# Patient Record
Sex: FEMALE | Race: WHITE | NOT HISPANIC OR LATINO | URBAN - METROPOLITAN AREA
[De-identification: names, ages, dates, MRNs, and addresses within clinical notes are randomized per-mention and may not be internally consistent; named-entity substitution may affect disease eponyms.]

---

## 2024-06-23 ENCOUNTER — EMERGENCY (EMERGENCY)
Facility: HOSPITAL | Age: 89
LOS: 0 days | Discharge: HOME | End: 2024-06-24
Attending: STUDENT IN AN ORGANIZED HEALTH CARE EDUCATION/TRAINING PROGRAM
Payer: MEDICARE

## 2024-06-23 VITALS
SYSTOLIC BLOOD PRESSURE: 123 MMHG | RESPIRATION RATE: 17 BRPM | TEMPERATURE: 98 F | DIASTOLIC BLOOD PRESSURE: 60 MMHG | WEIGHT: 109.79 LBS | HEART RATE: 90 BPM | OXYGEN SATURATION: 95 %

## 2024-06-23 DIAGNOSIS — M79.672 PAIN IN LEFT FOOT: ICD-10-CM
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
## 2024-06-23 DIAGNOSIS — G20.A1 PARKINSON'S DISEASE WITHOUT DYSKINESIA, WITHOUT MENTION OF FLUCTUATIONS: ICD-10-CM

## 2024-06-23 PROCEDURE — 73600 X-RAY EXAM OF ANKLE: CPT | Mod: 26,LT

## 2024-06-23 PROCEDURE — 73630 X-RAY EXAM OF FOOT: CPT | Mod: LT

## 2024-06-23 PROCEDURE — 73590 X-RAY EXAM OF LOWER LEG: CPT | Mod: 26,LT

## 2024-06-23 PROCEDURE — 99285 EMERGENCY DEPT VISIT HI MDM: CPT

## 2024-06-23 PROCEDURE — 73630 X-RAY EXAM OF FOOT: CPT | Mod: 26,LT

## 2024-06-23 PROCEDURE — 73110 X-RAY EXAM OF WRIST: CPT | Mod: LT

## 2024-06-23 PROCEDURE — 73110 X-RAY EXAM OF WRIST: CPT | Mod: 26,LT

## 2024-06-23 PROCEDURE — 73590 X-RAY EXAM OF LOWER LEG: CPT | Mod: LT

## 2024-06-23 PROCEDURE — 76376 3D RENDER W/INTRP POSTPROCES: CPT | Mod: 26

## 2024-06-23 PROCEDURE — 76376 3D RENDER W/INTRP POSTPROCES: CPT

## 2024-06-23 PROCEDURE — 73600 X-RAY EXAM OF ANKLE: CPT | Mod: LT

## 2024-06-23 PROCEDURE — 73700 CT LOWER EXTREMITY W/O DYE: CPT | Mod: MC,LT

## 2024-06-23 PROCEDURE — 99284 EMERGENCY DEPT VISIT MOD MDM: CPT | Mod: 25

## 2024-06-23 PROCEDURE — 73700 CT LOWER EXTREMITY W/O DYE: CPT | Mod: 26,LT,MC

## 2024-06-23 NOTE — ED PROVIDER NOTE - OBJECTIVE STATEMENT
89 year old female with PMHx of Parkinson's disease; presents to ED s/p unwitnessed fall in kitchen this AM, landed on stomach. Unknown head-strike. No LOC or AC use. Patient c/o left foot pain and swelling, worsened throughout the day and has pain with ambulation. Denies nausea, vomiting, HA 89 year old female with PMHx of Parkinson's disease; presents to ED s/p unwitnessed fall in kitchen this AM, landed on stomach. Unknown head-strike. No LOC or AC use. Patient c/o left foot pain and swelling, worsened throughout the day and has pain with ambulation. Denies nausea, vomiting, HA; no chest pain; no sob; no abdominal pain; no headache

## 2024-06-23 NOTE — ED PROVIDER NOTE - PATIENT PORTAL LINK FT
You can access the FollowMyHealth Patient Portal offered by [facility] by registering at the following website: [email or web address]. By joining [employer]’s FollowMyHealth portal, you will also be able to view your health information using other applications (apps) compatible with our system.

## 2024-06-23 NOTE — ED PROVIDER NOTE - WR ORDER STATUS 2
Performed
no orthopnea/no palpitations/no paroxysmal nocturnal dyspnea/no claudication/no dyspnea on exertion/no peripheral edema/no chest pain

## 2024-06-23 NOTE — ED PROVIDER NOTE - PROGRESS NOTE DETAILS
Jonathon Kovacs for attending Dr. Carr  Spoke with radiologist regarding foot XR, no evidence of fracture. As patient still exquisitely tender and unable to ambulate will obtain CT imaging. Family in agreement with plan Jabari Amaya MD CT rao reed dc. family comfortable w/ plan

## 2024-06-23 NOTE — ED PROVIDER NOTE - CARE PROVIDERS DIRECT ADDRESSES
[email or web address]

## 2024-06-23 NOTE — ED PROVIDER NOTE - WR ORDER DATE AND TIME 4
"Chief Complaint   Patient presents with     Wellness Visit       Initial /78  Pulse 72  Temp 98  F (36.7  C) (Oral)  Ht 5' 4\" (1.626 m)  Wt 197 lb (89.4 kg)  SpO2 96%  BMI 33.81 kg/m2 Estimated body mass index is 33.81 kg/(m^2) as calculated from the following:    Height as of this encounter: 5' 4\" (1.626 m).    Weight as of this encounter: 197 lb (89.4 kg).  Medication Reconciliation: complete   Sola Gamboa Certified Medical Assistant      " 23-Jun-2024 21:14

## 2024-06-23 NOTE — ED ADULT TRIAGE NOTE - CHIEF COMPLAINT QUOTE
Pt presents to ED via wheelchair s/p unwitnessed fall at 0900. PMH parkinsons. Pt states she was walking in kitchen and slipped landing on stomach, endorses worsening L foot pain of 9 out of 10 w ambulation. Pt denies LOC or AC, unknown headstrike. Pt also denies n/v HA, visual changes. NKDA, Pt A&O4 at triage. MD. Carr called for eval, no code called.

## 2024-06-23 NOTE — ED PROVIDER NOTE - CARE PROVIDER_API CALL
Delta Bui  Orthopaedic Surgery  [address] [place] [id number]  Phone: [phone]  Fax: [phone]  Follow Up Time:

## 2024-06-23 NOTE — ED PROVIDER NOTE - NSFOLLOWUPINSTRUCTIONS_ED_ALL_ED_FT
Foot Pain  Many things can cause foot pain. Common causes include injuries to the foot. The injuries include sprains or broken bones, or injuries that affect the nerves in the feet. Other causes of foot pain include arthritis, blisters, and bunions.    To know what causes your foot pain, your health care provider will take a detailed history of your symptoms. They will also do a physical exam as well as imaging tests, such as X-ray or MRI.    Follow these instructions at home:  Managing pain, stiffness, and swelling    Bag of ice on a towel on the skin.  If told, put ice on the painful area.  Put ice in a plastic bag.  Place a towel between your skin and the bag.  Leave the ice on for 20 minutes, 2–3 times a day.  If your skin turns bright red, remove the ice right away to prevent skin damage. The risk of damage is higher if you cannot feel pain, heat, or cold.  Activity    Do not stand or walk for long periods.  Do stretches to relieve foot pain and stiffness as told by your provider.  Do not lift anything that is heavier than 10 lb (4.5 kg), or the limit that you are told, until your provider says that it is safe. Lifting a lot of weight can put added pressure on your feet.  Return to your normal activities as told by your provider. Ask your provider what activities are safe for you.  Lifestyle    Wear comfortable, supportive shoes that fit you well. Do not wear high heels.  Keep your feet clean and dry.  General instructions    Take over-the-counter and prescription medicines only as told by your provider.  Rub your foot gently.  Pay attention to any changes in your symptoms. Let your provider know if symptoms become worse.  Keep all follow-up visits. Your provider will want to monitor your progress.  Contact a health care provider if:  Your pain does not get better after a few days of treatment at home.  Your pain gets worse.  You cannot stand on your foot.  Your foot or toes are swollen.  Your foot is numb or tingling.  Get help right away if:  Your foot or toes turn white or blue.  You have warmth and redness along your foot.  This information is not intended to replace advice given to you by your health care provider. Make sure you discuss any questions you have with your health care provider.    Document Revised: 01/11/2024 Document Reviewed: 09/19/2023  [employer] Patient Education © 2024 Elsevier Inc.  Elsevier logo  Terms and Conditions  Privacy Policy  Editorial Policy  All content on this site: Copyright © 2024 Elsevier, its licensors, and contributors. All rights are reserved, including those for text and data mining, AI training, and similar technologies. For all open access content, the Creative Commons licensing terms apply.  Cookies are used by this site. To decline or learn more, visit our Cookies page.  [employer]

## 2024-06-23 NOTE — ED PROVIDER NOTE - CLINICAL SUMMARY MEDICAL DECISION MAKING FREE TEXT BOX
89 year old female presents to ED c/o left foot pain and swelling, and old wrist pain. Plan XR, pain control and re-eval. 89 year old female presents to ED c/o left foot pain and swelling, and old wrist pain. Plan XR, pain control and re-eval.    Lilly DO: patient still with left foot pain; inability to ambulate; CT ordered for further eval; s/o to Dr. Amaya at 1045pm.

## 2024-06-24 VITALS
HEART RATE: 100 BPM | DIASTOLIC BLOOD PRESSURE: 60 MMHG | RESPIRATION RATE: 16 BRPM | OXYGEN SATURATION: 100 % | SYSTOLIC BLOOD PRESSURE: 135 MMHG | TEMPERATURE: 98 F

## 2024-06-24 NOTE — ED ADULT NURSE NOTE - NS ED NURSE RECORD ANOTHER HT AND WT
What Type Of Note Output Would You Prefer (Optional)?: Bullet Format How Severe Is Your Skin Lesion?: moderate Has Your Skin Lesion Been Treated?: not been treated Is This A New Presentation, Or A Follow-Up?: Skin Lesion Yes

## 2024-06-24 NOTE — ED ADULT NURSE NOTE - OBJECTIVE STATEMENT
Pt presents to ED c/o unwitnessed fall at 0900. PMHx Parkinson's. Pt states she was walking in kitchen and slipped landing on stomach, endorses worsening L foot pain, 9/10 w/ ambulation. Pt denies LOC, unknown headstrike, denies H/A, visual changes, dizziness. NKDA. Pt AxOx4, RR even and unlabored on RA.

## 2024-06-24 NOTE — ED ADULT NURSE NOTE - NSFALLHARMRISKINTERV_ED_ALL_ED

## 2024-09-27 ENCOUNTER — APPOINTMENT (OUTPATIENT)
Dept: SURGERY | Facility: CLINIC | Age: 89
End: 2024-09-27
Payer: MEDICARE

## 2024-09-27 VITALS
RESPIRATION RATE: 16 BRPM | BODY MASS INDEX: 20.43 KG/M2 | OXYGEN SATURATION: 94 % | HEART RATE: 82 BPM | HEIGHT: 62 IN | TEMPERATURE: 97.8 F | SYSTOLIC BLOOD PRESSURE: 117 MMHG | DIASTOLIC BLOOD PRESSURE: 66 MMHG | WEIGHT: 111 LBS

## 2024-09-27 DIAGNOSIS — S81.802A UNSPECIFIED OPEN WOUND, LEFT LOWER LEG, INITIAL ENCOUNTER: ICD-10-CM

## 2024-09-27 DIAGNOSIS — G20.A1 PARKINSON'S DISEASE WITHOUT DYSKINESIA, WITHOUT MENTION OF FLUCTUATIONS: ICD-10-CM

## 2024-09-27 PROBLEM — Z00.00 ENCOUNTER FOR PREVENTIVE HEALTH EXAMINATION: Status: ACTIVE | Noted: 2024-09-27

## 2024-09-27 PROCEDURE — 99202 OFFICE O/P NEW SF 15 MIN: CPT

## 2024-09-27 RX ORDER — CARBIDOPA AND LEVODOPA 25; 250 MG/1; MG/1
TABLET ORAL
Refills: 0 | Status: ACTIVE | COMMUNITY

## 2024-09-27 RX ORDER — SERTRALINE 25 MG/1
TABLET, FILM COATED ORAL
Refills: 0 | Status: ACTIVE | COMMUNITY

## 2024-09-27 NOTE — CONSULT LETTER
[Dear  ___] : Dear  [unfilled], [Consult Letter:] : I had the pleasure of evaluating your patient, [unfilled]. [Please see my note below.] : Please see my note below. [Consult Closing:] : Thank you very much for allowing me to participate in the care of this patient.  If you have any questions, please do not hesitate to contact me. [Sincerely,] : Sincerely, [FreeTextEntry3] : Wm Kailash HIDALGO

## 2024-09-27 NOTE — PHYSICAL EXAM
[JVD] : no jugular venous distention  [Normal Breath Sounds] : Normal breath sounds [Normal Heart Sounds] : normal heart sounds [Abdomen Tenderness] : ~T ~M No abdominal tenderness [No HSM] : no hepatosplenomegaly [Calm] : calm [de-identified] : NAD [de-identified] : NC/AT PER [de-identified] : soft [de-identified] : L shin wound 4 inches

## 2024-10-11 ENCOUNTER — INPATIENT (INPATIENT)
Facility: HOSPITAL | Age: 89
LOS: 3 days | Discharge: HOME | DRG: 536 | End: 2024-10-15
Attending: SURGERY | Admitting: SURGERY
Payer: MEDICARE

## 2024-10-11 VITALS
RESPIRATION RATE: 18 BRPM | TEMPERATURE: 97 F | DIASTOLIC BLOOD PRESSURE: 73 MMHG | HEART RATE: 77 BPM | OXYGEN SATURATION: 90 % | SYSTOLIC BLOOD PRESSURE: 137 MMHG

## 2024-10-11 DIAGNOSIS — Z28.21 IMMUNIZATION NOT CARRIED OUT BECAUSE OF PATIENT REFUSAL: ICD-10-CM

## 2024-10-11 DIAGNOSIS — Z98.1 ARTHRODESIS STATUS: ICD-10-CM

## 2024-10-11 PROCEDURE — 73552 X-RAY EXAM OF FEMUR 2/>: CPT | Mod: 26,LT

## 2024-10-11 PROCEDURE — 12001 RPR S/N/AX/GEN/TRNK 2.5CM/<: CPT

## 2024-10-11 PROCEDURE — 74176 CT ABD & PELVIS W/O CONTRAST: CPT | Mod: 26,MC

## 2024-10-11 PROCEDURE — 70450 CT HEAD/BRAIN W/O DYE: CPT | Mod: 26,MC

## 2024-10-11 PROCEDURE — 71250 CT THORAX DX C-: CPT | Mod: 26,MC

## 2024-10-11 PROCEDURE — 99285 EMERGENCY DEPT VISIT HI MDM: CPT | Mod: 25

## 2024-10-11 PROCEDURE — 72170 X-RAY EXAM OF PELVIS: CPT | Mod: 26

## 2024-10-11 PROCEDURE — 72125 CT NECK SPINE W/O DYE: CPT | Mod: 26,MC

## 2024-10-11 RX ORDER — MORPHINE SULFATE 30 MG/1
2 TABLET, FILM COATED, EXTENDED RELEASE ORAL ONCE
Refills: 0 | Status: DISCONTINUED | OUTPATIENT
Start: 2024-10-11 | End: 2024-10-11

## 2024-10-11 RX ORDER — ACETAMINOPHEN 325 MG
650 TABLET ORAL ONCE
Refills: 0 | Status: COMPLETED | OUTPATIENT
Start: 2024-10-11 | End: 2024-10-11

## 2024-10-11 RX ORDER — TETANUS TOXOID, REDUCED DIPHTHERIA TOXOID AND ACELLULAR PERTUSSIS VACCINE, ADSORBED 5; 2.5; 8; 8; 2.5 [IU]/.5ML; [IU]/.5ML; UG/.5ML; UG/.5ML; UG/.5ML
0.5 SUSPENSION INTRAMUSCULAR ONCE
Refills: 0 | Status: COMPLETED | OUTPATIENT
Start: 2024-10-11 | End: 2024-10-11

## 2024-10-11 RX ADMIN — TETANUS TOXOID, REDUCED DIPHTHERIA TOXOID AND ACELLULAR PERTUSSIS VACCINE, ADSORBED 0.5 MILLILITER(S): 5; 2.5; 8; 8; 2.5 SUSPENSION INTRAMUSCULAR at 22:12

## 2024-10-11 RX ADMIN — Medication 650 MILLIGRAM(S): at 22:12

## 2024-10-11 RX ADMIN — Medication 650 MILLIGRAM(S): at 23:40

## 2024-10-11 NOTE — ED ADULT TRIAGE NOTE - CHIEF COMPLAINT QUOTE
BIBEMS from home s/p mechanical fall down 2-3 steps. AAOx3. -LOC, +Head strike, -Anticoagulation. Lac to back of the head, bleeding controlled PTA. C/O left hip pain. MD Irvin called to bedside for eval, neuro alert called at 2115. GCS 15. Pt brought directly to CT.

## 2024-10-11 NOTE — ED PROVIDER NOTE - PROGRESS NOTE DETAILS
CT imaging  results negative  for any fx .   found to have pubic ramii fracture on my read. Ortho and trauma  consulted. pending eval.  discussed results with family.   lac repaired.  s/o to Dr. SIVAN Whitman..

## 2024-10-11 NOTE — ED ADULT NURSE NOTE - OBJECTIVE STATEMENT
Pt received on stretcher, A&Ox4, no labored breathing, pt sustained an unwitnessed mechanical fall backwards while walking up the stairs about 2-3 steps, no LOC, pt has her head wrapped with a curlex, pt denies any pain to the head however she is c/o left groin pain, pt is able to move all extremities, no deformities noted,
no

## 2024-10-11 NOTE — ED PROVIDER NOTE - PHYSICAL EXAMINATION
General: Patient in no acute distress, AAOX3.   HENMT: NC/AT, no nasal congestion, MMM  Neck: supple, no midline ttp.  CVS: regular rate and rhythm, no murmur  Resp: Good air entry bilaterally, No wheeze/rhonchi.  Abd: Soft non tender, non distended, +bowel sounds, No guarding, rebound tenderness   Ext: FROM in all ext, 2+ pulses throughout, cap refill<2 sec.  BACK: no midline tenderness, no stepoffs, no CVA ttp  NEURO: no focal deficit, gross motor and sensory intact throughout

## 2024-10-11 NOTE — ED PROVIDER NOTE - CLINICAL SUMMARY MEDICAL DECISION MAKING FREE TEXT BOX
89 yo f brought from home s/p mechanical fall down 2-3 steps. c/o left hip pain., will r/o ICH vs fx. Plan to do labs, imaging. meds and reassess.

## 2024-10-11 NOTE — ED ADULT NURSE REASSESSMENT NOTE - NS ED NURSE REASSESS COMMENT FT1
Pt has a 1 cm laceration to the right posterior head, no active bleeding, aprox 1cm long. Wound cleaned with NACI, pending repair by MD.

## 2024-10-11 NOTE — ED ADULT NURSE NOTE - NSFALLHARMRISKINTERV_ED_ALL_ED

## 2024-10-11 NOTE — ED PROVIDER NOTE - OBJECTIVE STATEMENT
89 yo f brought from home s/p mechanical fall down 2-3 steps. AAOx3. -LOC, +Head strike, -Anticoagulation. Lac to back of the head, bleeding controlled PTA. C/O left hip pain. 89 yo f brought from home s/p mechanical fall down 2-3 steps. c/o left hip pain., reported she hit her head, denies any LOC or any anticoagulation. Was unable to ambulate after due to L pelvic pain. Ambulates with walker at baseline. Denies Nausea, vomiting, numbness, tingling, fevers, chills, CP, SOB,

## 2024-10-12 DIAGNOSIS — S32.599A OTHER SPECIFIED FRACTURE OF UNSPECIFIED PUBIS, INITIAL ENCOUNTER FOR CLOSED FRACTURE: ICD-10-CM

## 2024-10-12 LAB
ABO RH CONFIRMATION: SIGNIFICANT CHANGE UP
ALBUMIN SERPL ELPH-MCNC: 2.5 G/DL — LOW (ref 3.3–5)
ALP SERPL-CCNC: 74 U/L — SIGNIFICANT CHANGE UP (ref 40–120)
ALT FLD-CCNC: 14 U/L — SIGNIFICANT CHANGE UP (ref 12–78)
ANION GAP SERPL CALC-SCNC: 4 MMOL/L — LOW (ref 5–17)
ANION GAP SERPL CALC-SCNC: 8 MMOL/L — SIGNIFICANT CHANGE UP (ref 5–17)
APTT BLD: 29.1 SEC — SIGNIFICANT CHANGE UP (ref 24.5–35.6)
AST SERPL-CCNC: 22 U/L — SIGNIFICANT CHANGE UP (ref 15–37)
BASOPHILS # BLD AUTO: 0.01 K/UL — SIGNIFICANT CHANGE UP (ref 0–0.2)
BASOPHILS NFR BLD AUTO: 0.1 % — SIGNIFICANT CHANGE UP (ref 0–2)
BILIRUB SERPL-MCNC: 0.8 MG/DL — SIGNIFICANT CHANGE UP (ref 0.2–1.2)
BLD GP AB SCN SERPL QL: SIGNIFICANT CHANGE UP
BUN SERPL-MCNC: 17 MG/DL — SIGNIFICANT CHANGE UP (ref 7–23)
BUN SERPL-MCNC: 20 MG/DL — SIGNIFICANT CHANGE UP (ref 7–23)
CALCIUM SERPL-MCNC: 8.8 MG/DL — SIGNIFICANT CHANGE UP (ref 8.5–10.1)
CALCIUM SERPL-MCNC: 9.1 MG/DL — SIGNIFICANT CHANGE UP (ref 8.5–10.1)
CHLORIDE SERPL-SCNC: 102 MMOL/L — SIGNIFICANT CHANGE UP (ref 96–108)
CHLORIDE SERPL-SCNC: 103 MMOL/L — SIGNIFICANT CHANGE UP (ref 96–108)
CO2 SERPL-SCNC: 25 MMOL/L — SIGNIFICANT CHANGE UP (ref 22–31)
CO2 SERPL-SCNC: 27 MMOL/L — SIGNIFICANT CHANGE UP (ref 22–31)
CREAT SERPL-MCNC: 0.5 MG/DL — SIGNIFICANT CHANGE UP (ref 0.5–1.3)
CREAT SERPL-MCNC: 0.57 MG/DL — SIGNIFICANT CHANGE UP (ref 0.5–1.3)
EGFR: 86 ML/MIN/1.73M2 — SIGNIFICANT CHANGE UP
EGFR: 88 ML/MIN/1.73M2 — SIGNIFICANT CHANGE UP
EOSINOPHIL # BLD AUTO: 0.03 K/UL — SIGNIFICANT CHANGE UP (ref 0–0.5)
EOSINOPHIL NFR BLD AUTO: 0.3 % — SIGNIFICANT CHANGE UP (ref 0–6)
GLUCOSE SERPL-MCNC: 117 MG/DL — HIGH (ref 70–99)
GLUCOSE SERPL-MCNC: 122 MG/DL — HIGH (ref 70–99)
HCT VFR BLD CALC: 36.2 % — SIGNIFICANT CHANGE UP (ref 34.5–45)
HCT VFR BLD CALC: 36.2 % — SIGNIFICANT CHANGE UP (ref 34.5–45)
HGB BLD-MCNC: 11.6 G/DL — SIGNIFICANT CHANGE UP (ref 11.5–15.5)
HGB BLD-MCNC: 11.8 G/DL — SIGNIFICANT CHANGE UP (ref 11.5–15.5)
IMM GRANULOCYTES NFR BLD AUTO: 0.8 % — SIGNIFICANT CHANGE UP (ref 0–0.9)
INR BLD: 1.11 RATIO — SIGNIFICANT CHANGE UP (ref 0.85–1.16)
LACTATE SERPL-SCNC: 1.2 MMOL/L — SIGNIFICANT CHANGE UP (ref 0.7–2)
LYMPHOCYTES # BLD AUTO: 0.88 K/UL — LOW (ref 1–3.3)
LYMPHOCYTES # BLD AUTO: 8.7 % — LOW (ref 13–44)
MAGNESIUM SERPL-MCNC: 1.8 MG/DL — SIGNIFICANT CHANGE UP (ref 1.6–2.6)
MCHC RBC-ENTMCNC: 27.8 PG — SIGNIFICANT CHANGE UP (ref 27–34)
MCHC RBC-ENTMCNC: 28.2 PG — SIGNIFICANT CHANGE UP (ref 27–34)
MCHC RBC-ENTMCNC: 32 GM/DL — SIGNIFICANT CHANGE UP (ref 32–36)
MCHC RBC-ENTMCNC: 32.6 GM/DL — SIGNIFICANT CHANGE UP (ref 32–36)
MCV RBC AUTO: 86.4 FL — SIGNIFICANT CHANGE UP (ref 80–100)
MCV RBC AUTO: 86.6 FL — SIGNIFICANT CHANGE UP (ref 80–100)
MONOCYTES # BLD AUTO: 0.44 K/UL — SIGNIFICANT CHANGE UP (ref 0–0.9)
MONOCYTES NFR BLD AUTO: 4.3 % — SIGNIFICANT CHANGE UP (ref 2–14)
NEUTROPHILS # BLD AUTO: 8.71 K/UL — HIGH (ref 1.8–7.4)
NEUTROPHILS NFR BLD AUTO: 85.8 % — HIGH (ref 43–77)
PHOSPHATE SERPL-MCNC: 3 MG/DL — SIGNIFICANT CHANGE UP (ref 2.5–4.5)
PLATELET # BLD AUTO: 182 K/UL — SIGNIFICANT CHANGE UP (ref 150–400)
PLATELET # BLD AUTO: 197 K/UL — SIGNIFICANT CHANGE UP (ref 150–400)
POTASSIUM SERPL-MCNC: 3.6 MMOL/L — SIGNIFICANT CHANGE UP (ref 3.5–5.3)
POTASSIUM SERPL-MCNC: 3.8 MMOL/L — SIGNIFICANT CHANGE UP (ref 3.5–5.3)
POTASSIUM SERPL-SCNC: 3.6 MMOL/L — SIGNIFICANT CHANGE UP (ref 3.5–5.3)
POTASSIUM SERPL-SCNC: 3.8 MMOL/L — SIGNIFICANT CHANGE UP (ref 3.5–5.3)
PROT SERPL-MCNC: 6.7 GM/DL — SIGNIFICANT CHANGE UP (ref 6–8.3)
PROTHROM AB SERPL-ACNC: 13.1 SEC — SIGNIFICANT CHANGE UP (ref 9.9–13.4)
RBC # BLD: 4.18 M/UL — SIGNIFICANT CHANGE UP (ref 3.8–5.2)
RBC # BLD: 4.19 M/UL — SIGNIFICANT CHANGE UP (ref 3.8–5.2)
RBC # FLD: 13.8 % — SIGNIFICANT CHANGE UP (ref 10.3–14.5)
RBC # FLD: 13.9 % — SIGNIFICANT CHANGE UP (ref 10.3–14.5)
SODIUM SERPL-SCNC: 133 MMOL/L — LOW (ref 135–145)
SODIUM SERPL-SCNC: 136 MMOL/L — SIGNIFICANT CHANGE UP (ref 135–145)
WBC # BLD: 10.15 K/UL — SIGNIFICANT CHANGE UP (ref 3.8–10.5)
WBC # BLD: 10.35 K/UL — SIGNIFICANT CHANGE UP (ref 3.8–10.5)
WBC # FLD AUTO: 10.15 K/UL — SIGNIFICANT CHANGE UP (ref 3.8–10.5)
WBC # FLD AUTO: 10.35 K/UL — SIGNIFICANT CHANGE UP (ref 3.8–10.5)

## 2024-10-12 PROCEDURE — 99223 1ST HOSP IP/OBS HIGH 75: CPT

## 2024-10-12 PROCEDURE — 84100 ASSAY OF PHOSPHORUS: CPT

## 2024-10-12 PROCEDURE — 80048 BASIC METABOLIC PNL TOTAL CA: CPT

## 2024-10-12 PROCEDURE — 82306 VITAMIN D 25 HYDROXY: CPT

## 2024-10-12 PROCEDURE — 99221 1ST HOSP IP/OBS SF/LOW 40: CPT

## 2024-10-12 PROCEDURE — 83735 ASSAY OF MAGNESIUM: CPT

## 2024-10-12 PROCEDURE — ZZZZZ: CPT

## 2024-10-12 PROCEDURE — 36415 COLL VENOUS BLD VENIPUNCTURE: CPT

## 2024-10-12 PROCEDURE — 85027 COMPLETE CBC AUTOMATED: CPT

## 2024-10-12 PROCEDURE — 85025 COMPLETE CBC W/AUTO DIFF WBC: CPT

## 2024-10-12 PROCEDURE — 97530 THERAPEUTIC ACTIVITIES: CPT | Mod: GP

## 2024-10-12 PROCEDURE — 83605 ASSAY OF LACTIC ACID: CPT

## 2024-10-12 PROCEDURE — 97116 GAIT TRAINING THERAPY: CPT | Mod: GP

## 2024-10-12 RX ORDER — SERTRALINE HYDROCHLORIDE 100 MG/1
1.5 TABLET, FILM COATED ORAL
Refills: 0 | DISCHARGE

## 2024-10-12 RX ORDER — CARBIDOPA/LEVODOPA 25MG-100MG
1.5 TABLET ORAL
Refills: 0 | DISCHARGE

## 2024-10-12 RX ORDER — ENOXAPARIN SODIUM 150 MG/ML
40 INJECTION SUBCUTANEOUS EVERY 24 HOURS
Refills: 0 | Status: DISCONTINUED | OUTPATIENT
Start: 2024-10-12 | End: 2024-10-15

## 2024-10-12 RX ORDER — OXYCODONE HYDROCHLORIDE 30 MG/1
5 TABLET, FILM COATED, EXTENDED RELEASE ORAL ONCE
Refills: 0 | Status: DISCONTINUED | OUTPATIENT
Start: 2024-10-12 | End: 2024-10-15

## 2024-10-12 RX ORDER — CARBIDOPA/LEVODOPA 25MG-100MG
1 TABLET ORAL THREE TIMES A DAY
Refills: 0 | Status: DISCONTINUED | OUTPATIENT
Start: 2024-10-12 | End: 2024-10-15

## 2024-10-12 RX ORDER — ONDANSETRON HCL/PF 4 MG/2 ML
4 VIAL (ML) INJECTION EVERY 6 HOURS
Refills: 0 | Status: DISCONTINUED | OUTPATIENT
Start: 2024-10-12 | End: 2024-10-15

## 2024-10-12 RX ORDER — OXYCODONE HYDROCHLORIDE 30 MG/1
2.5 TABLET, FILM COATED, EXTENDED RELEASE ORAL EVERY 6 HOURS
Refills: 0 | Status: DISCONTINUED | OUTPATIENT
Start: 2024-10-12 | End: 2024-10-15

## 2024-10-12 RX ORDER — SERTRALINE HYDROCHLORIDE 100 MG/1
100 TABLET, FILM COATED ORAL DAILY
Refills: 0 | Status: DISCONTINUED | OUTPATIENT
Start: 2024-10-12 | End: 2024-10-15

## 2024-10-12 RX ORDER — CYCLOBENZAPRINE HCL 10 MG
5 TABLET ORAL THREE TIMES A DAY
Refills: 0 | Status: DISCONTINUED | OUTPATIENT
Start: 2024-10-12 | End: 2024-10-15

## 2024-10-12 RX ORDER — LIDOCAINE 50 MG/G
1 CREAM TOPICAL EVERY 24 HOURS
Refills: 0 | Status: DISCONTINUED | OUTPATIENT
Start: 2024-10-12 | End: 2024-10-15

## 2024-10-12 RX ORDER — ACETAMINOPHEN 325 MG
650 TABLET ORAL EVERY 6 HOURS
Refills: 0 | Status: DISCONTINUED | OUTPATIENT
Start: 2024-10-12 | End: 2024-10-15

## 2024-10-12 RX ORDER — GABAPENTIN 800 MG/1
100 TABLET, FILM COATED ORAL THREE TIMES A DAY
Refills: 0 | Status: DISCONTINUED | OUTPATIENT
Start: 2024-10-12 | End: 2024-10-15

## 2024-10-12 RX ORDER — SODIUM CHLORIDE 0.9 % (FLUSH) 0.9 %
1000 SYRINGE (ML) INJECTION
Refills: 0 | Status: DISCONTINUED | OUTPATIENT
Start: 2024-10-12 | End: 2024-10-13

## 2024-10-12 RX ADMIN — ENOXAPARIN SODIUM 40 MILLIGRAM(S): 150 INJECTION SUBCUTANEOUS at 10:32

## 2024-10-12 RX ADMIN — GABAPENTIN 100 MILLIGRAM(S): 800 TABLET, FILM COATED ORAL at 22:15

## 2024-10-12 RX ADMIN — LIDOCAINE 1 PATCH: 50 CREAM TOPICAL at 04:41

## 2024-10-12 RX ADMIN — LIDOCAINE 1 PATCH: 50 CREAM TOPICAL at 14:08

## 2024-10-12 RX ADMIN — Medication 40 MILLIEQUIVALENT(S): at 10:32

## 2024-10-12 RX ADMIN — Medication 50 MILLILITER(S): at 04:43

## 2024-10-12 RX ADMIN — OXYCODONE HYDROCHLORIDE 2.5 MILLIGRAM(S): 30 TABLET, FILM COATED, EXTENDED RELEASE ORAL at 05:13

## 2024-10-12 RX ADMIN — SERTRALINE HYDROCHLORIDE 100 MILLIGRAM(S): 100 TABLET, FILM COATED ORAL at 15:05

## 2024-10-12 RX ADMIN — Medication 1 TABLET(S): at 22:15

## 2024-10-12 RX ADMIN — GABAPENTIN 100 MILLIGRAM(S): 800 TABLET, FILM COATED ORAL at 06:53

## 2024-10-12 RX ADMIN — Medication 1 TABLET(S): at 14:40

## 2024-10-12 RX ADMIN — OXYCODONE HYDROCHLORIDE 2.5 MILLIGRAM(S): 30 TABLET, FILM COATED, EXTENDED RELEASE ORAL at 04:43

## 2024-10-12 NOTE — CONSULT NOTE ADULT - SUBJECTIVE AND OBJECTIVE BOX
PCP: Zhen Spears    CHIEF COMPLAINT: mechanical fall    HISTORY OF THE PRESENT ILLNESS: 89 yo F w PMHx of Parkinsons, Lumbar fusion, and appendectomy coming in to ED after a mechanical fall walking up the stairs. The patient states she fell backwards when she lost balance and hit her head. Denies LOC. Daughter at bedside corroborated the story. Was previously on AC, but has since discontinued it for years. Denies chest pain, SOB, fevers or chills. No external signs of trauma. Pan scan negative, after calling radiology and reviewal of CT scan, bilateral pubic rami frx are noted.     PAST MEDICAL HISTORY:  Parkinsons    PAST SURGICAL HISTORY: Lumbar fusion, and appendectomy    FAMILY HISTORY:   non-contributory to the patient's current presentation    SOCIAL HISTORY:  no smoking, no alcohol, no drugs    REVIEW OF SYSTEMS:   All 10 systems reviewed in detailed and found to be negative with the exception of what has already been described above    MEDICATIONS  (STANDING):  enoxaparin Injectable 40 milliGRAM(s) SubCutaneous every 24 hours  gabapentin 100 milliGRAM(s) Oral three times a day  lidocaine   4% Patch 1 Patch Transdermal every 24 hours  sodium chloride 0.9%. 1000 milliLiter(s) (50 mL/Hr) IV Continuous <Continuous>    MEDICATIONS  (PRN):  acetaminophen     Tablet .. 650 milliGRAM(s) Oral every 6 hours PRN Temp greater or equal to 38C (100.4F), Mild Pain (1 - 3)  cyclobenzaprine 5 milliGRAM(s) Oral three times a day PRN Muscle Spasm  ondansetron Injectable 4 milliGRAM(s) IV Push every 6 hours PRN Nausea  oxyCODONE    IR 5 milliGRAM(s) Oral Once PRN Severe Pain (7 - 10)  oxyCODONE    IR 2.5 milliGRAM(s) Oral every 6 hours PRN Moderate Pain (4 - 6)      HEENT:  pupils equal and reactive, EOMI, no oropharyngeal lesions, erythema, exudates, oral thrush  NECK:   supple, no carotid bruits  CV:  +S1, +S2, regular, no murmurs  RESP:   lungs clear to auscultation bilaterally, no wheezing, rales, rhonchi, good air entry bilaterally  GI:  abdomen soft, non-tender, non-distended, normal BS  EXT:  no clubbing, no cyanosis, no edema, no calf pain, swelling or erythema  NEURO:  AAOX3, no focal neurological deficits, follows all commands, able to move extremities spontaneously  SKIN:  no ulcers, lesions or rashes    LABS:                          11.6   10.35 )-----------( 197      ( 12 Oct 2024 09:37 )             36.2     10-12    133[L]  |  102  |  17  ----------------------------<  117[H]  3.8   |  27  |  0.57    Ca    8.8      12 Oct 2024 09:37  Phos  3.0     10-12  Mg     1.8     10-12    TPro  6.7  /  Alb  2.5[L]  /  TBili  0.8  /  DBili  x   /  AST  22  /  ALT  14  /  AlkPhos  74  10-12        LIVER FUNCTIONS - ( 12 Oct 2024 00:37 )  Alb: 2.5 g/dL / Pro: 6.7 gm/dL / ALK PHOS: 74 U/L / ALT: 14 U/L / AST: 22 U/L / GGT: x           PT/INR - ( 12 Oct 2024 00:37 )   PT: 13.1 sec;   INR: 1.11 ratio         PTT - ( 12 Oct 2024 00:37 )  PTT:29.1 sec    Urinalysis Basic - ( 12 Oct 2024 09:37 )    Color: x / Appearance: x / SG: x / pH: x  Gluc: 117 mg/dL / Ketone: x  / Bili: x / Urobili: x   Blood: x / Protein: x / Nitrite: x   Leuk Esterase: x / RBC: x / WBC x   Sq Epi: x / Non Sq Epi: x / Bacteria: x        Lactate, Blood: 1.2 mmol/L (10-12 @ 01:56)      Troponin Trend: Lactate, Blood: 1.2 mmol/L (10-12 @ 01:56)                          EKG:     RADIOLOGY STUDIES:    IMPRESSION: Moderate to severe right and moderate left hip   osteoarthrosis. Lower lumbar degenerative disc disease. 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    Displaced fractures of the left superior and inferior pubic rami    No femoral neck fracture.            RECOMMENDATIONS:    89 yo F w PMHx of Parkinsons, Lumbar fusion, and appendectomy coming in to ED after a mechanical fall walking up the stairs.  Mildly displaced fractures involving the left anterior and superior pubic rami, with extension to the anterior left acetabulum and nondisplaced fractures involving the right superior and inferior pubic rami. Hospitalist team consulted for comanagement.       Bilateral pubic rami fx   Trauma team primary   Ortho consulted, recommendations appreciated   No acute orthopaedic interventions at this time  Multimodal pain control   Continue to work with PT/OT   WBAT    Hx Parkinsons  Discussed with pharmacy, Med rec to be completed   Patient unclear of her medications.   Restart home meds        DVT ppx Lovenox as per primary           PCP: Zhen Spears    CHIEF COMPLAINT: mechanical fall    HISTORY OF THE PRESENT ILLNESS: 89 yo F w PMHx of Parkinsons, Lumbar fusion, and appendectomy coming in to ED after a mechanical fall walking up the stairs. The patient states she fell backwards when she lost balance and hit her head. Denies LOC. Daughter at bedside corroborated the story. Was previously on AC, but has since discontinued it for years. Denies chest pain, SOB, fevers or chills. No external signs of trauma. Pan scan negative, after calling radiology and reviewal of CT scan, bilateral pubic rami frx are noted.     PAST MEDICAL HISTORY:  Parkinsons    PAST SURGICAL HISTORY: Lumbar fusion, and appendectomy    FAMILY HISTORY:   non-contributory to the patient's current presentation    SOCIAL HISTORY:  no smoking, no alcohol, no drugs    REVIEW OF SYSTEMS:   All 10 systems reviewed in detailed and found to be negative with the exception of what has already been described above    MEDICATIONS  (STANDING):  enoxaparin Injectable 40 milliGRAM(s) SubCutaneous every 24 hours  gabapentin 100 milliGRAM(s) Oral three times a day  lidocaine   4% Patch 1 Patch Transdermal every 24 hours  sodium chloride 0.9%. 1000 milliLiter(s) (50 mL/Hr) IV Continuous <Continuous>    MEDICATIONS  (PRN):  acetaminophen     Tablet .. 650 milliGRAM(s) Oral every 6 hours PRN Temp greater or equal to 38C (100.4F), Mild Pain (1 - 3)  cyclobenzaprine 5 milliGRAM(s) Oral three times a day PRN Muscle Spasm  ondansetron Injectable 4 milliGRAM(s) IV Push every 6 hours PRN Nausea  oxyCODONE    IR 5 milliGRAM(s) Oral Once PRN Severe Pain (7 - 10)  oxyCODONE    IR 2.5 milliGRAM(s) Oral every 6 hours PRN Moderate Pain (4 - 6)      HEENT:  pupils equal and reactive, EOMI, no oropharyngeal lesions, erythema, exudates, oral thrush  NECK:   supple, no carotid bruits  CV:  +S1, +S2, regular, no murmurs  RESP:   lungs clear to auscultation bilaterally, no wheezing, rales, rhonchi, good air entry bilaterally  GI:  abdomen soft, non-tender, non-distended, normal BS  EXT:  no clubbing, no cyanosis, no edema, no calf pain, swelling or erythema  NEURO:  AAOX3, no focal neurological deficits, follows all commands, able to move extremities spontaneously  SKIN:  no ulcers, lesions or rashes    LABS:                          11.6   10.35 )-----------( 197      ( 12 Oct 2024 09:37 )             36.2     10-12    133[L]  |  102  |  17  ----------------------------<  117[H]  3.8   |  27  |  0.57    Ca    8.8      12 Oct 2024 09:37  Phos  3.0     10-12  Mg     1.8     10-12    TPro  6.7  /  Alb  2.5[L]  /  TBili  0.8  /  DBili  x   /  AST  22  /  ALT  14  /  AlkPhos  74  10-12        LIVER FUNCTIONS - ( 12 Oct 2024 00:37 )  Alb: 2.5 g/dL / Pro: 6.7 gm/dL / ALK PHOS: 74 U/L / ALT: 14 U/L / AST: 22 U/L / GGT: x           PT/INR - ( 12 Oct 2024 00:37 )   PT: 13.1 sec;   INR: 1.11 ratio         PTT - ( 12 Oct 2024 00:37 )  PTT:29.1 sec    Urinalysis Basic - ( 12 Oct 2024 09:37 )    Color: x / Appearance: x / SG: x / pH: x  Gluc: 117 mg/dL / Ketone: x  / Bili: x / Urobili: x   Blood: x / Protein: x / Nitrite: x   Leuk Esterase: x / RBC: x / WBC x   Sq Epi: x / Non Sq Epi: x / Bacteria: x        Lactate, Blood: 1.2 mmol/L (10-12 @ 01:56)      Troponin Trend: Lactate, Blood: 1.2 mmol/L (10-12 @ 01:56)                          EKG:     RADIOLOGY STUDIES:    IMPRESSION: Moderate to severe right and moderate left hip   osteoarthrosis. Lower lumbar degenerative disc disease. 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    Displaced fractures of the left superior and inferior pubic rami    No femoral neck fracture.            RECOMMENDATIONS:    89 yo F w PMHx of Parkinsons, Lumbar fusion, and appendectomy coming in to ED after a mechanical fall walking up the stairs.  Mildly displaced fractures involving the left anterior and superior pubic rami, with extension to the anterior left acetabulum and nondisplaced fractures involving the right superior and inferior pubic rami. Hospitalist team consulted for comanagement.       Bilateral pubic rami fx   Trauma team primary   Ortho consulted, recommendations appreciated   No acute orthopaedic interventions at this time  Multimodal pain control   Continue to work with PT/OT   WBAT    Hx Parkinsons  Continue home sinemet    Depression  Continue home Sertraline        DVT ppx Lovenox as per primary

## 2024-10-12 NOTE — DIETITIAN INITIAL EVALUATION ADULT - PERTINENT MEDS FT
MEDICATIONS  (STANDING):  enoxaparin Injectable 40 milliGRAM(s) SubCutaneous every 24 hours  gabapentin 100 milliGRAM(s) Oral three times a day  lidocaine   4% Patch 1 Patch Transdermal every 24 hours  sodium chloride 0.9%. 1000 milliLiter(s) (50 mL/Hr) IV Continuous <Continuous>    MEDICATIONS  (PRN):  acetaminophen     Tablet .. 650 milliGRAM(s) Oral every 6 hours PRN Temp greater or equal to 38C (100.4F), Mild Pain (1 - 3)  cyclobenzaprine 5 milliGRAM(s) Oral three times a day PRN Muscle Spasm  ondansetron Injectable 4 milliGRAM(s) IV Push every 6 hours PRN Nausea  oxyCODONE    IR 5 milliGRAM(s) Oral Once PRN Severe Pain (7 - 10)  oxyCODONE    IR 2.5 milliGRAM(s) Oral every 6 hours PRN Moderate Pain (4 - 6)

## 2024-10-12 NOTE — CONSULT NOTE ADULT - SUBJECTIVE AND OBJECTIVE BOX
Pt is a 89y Female who presents to the ED with L pelvic after trip and fall. Had head striker but no LOC. Was unable to ambulate after due to L pelvic pain. Ambulates with walker at baseline. Denies numbness, tingling, fevers, chills, CP, SOB, N/V/D.    Vital Signs (24 Hrs):  T(C): 36.2 (10-11-24 @ 21:16), Max: 36.2 (10-11-24 @ 21:16)  HR: 77 (10-11-24 @ 21:16) (77 - 77)  BP: 137/73 (10-11-24 @ 21:16) (137/73 - 137/73)  RR: 18 (10-11-24 @ 21:16) (18 - 18)  SpO2: 90% (10-11-24 @ 21:16) (90% - 90%)    LABS:      Physical Exam:  General: NAD, pt laying in bed comfortably    LLE:  Skin intact, TTP L pelvis  Pain with SLR  No pain with log roll, axial load  Compartments soft, compressible  Calves nontender  Motor: +GSC, TA, EHL, FHL  SILT: SPN, DPN, Tib, Saph, Carrillo  +DP    Secondary Assessment:  Laceration to back of head, NC/AT, NTTP of clavicles, NTTP of C-,T-,L-Spine, NTTP of Pelvis  UEs: NTTP of Shoulders, Elbows, Wrists, Hands; NT with AROM/PROM of Shoulders, Elbows, Wrists, Hands; AIN/PIN/Med/Uln/Msc/Rad/Ax intact  LE: Able to SLR, NT with Log Roll, NT with Heel Strike, NTTP of Hip, Knee, Ankle, Foot; NT with AROM/PROM of Hip, Knee, Ankle, Foot; Q/H/Gsc/TA/EHL/FHL intact    Imaging:  XR Pelvis: superior, inferior pubic rami fx (personal read)    A/P:  89y Female who presents with L LC1 fx    WBAT  PT/OT  Analgesics  DVT PPx per primary team  Incentive spirometry  No acute orthopaedic interventions at this time  Stable for discharge from orthopaedic standpoint   Follow up with Dr. Schmitt in office for further management  Ortho to sign off, can reconsult with any changes in clinical course  Will discuss plan with Dr. Schmitt and notify primary team of any changes to plan

## 2024-10-12 NOTE — PHYSICAL THERAPY INITIAL EVALUATION ADULT - IMPAIRMENTS CONTRIBUTING TO GAIT DEVIATIONS, PT EVAL
----- Message from DANYELL Day CNP sent at 1/2/2023  9:22 AM CST -----  Regarding: RE: sedated ABR?  I just had our surgery scheduler check, and unfortunately the OR is fully booked. (We are unable to add on a 45 min procedure to dentals OR block.) Shoot!  ----- Message -----  From: Thania Moses MD  Sent: 12/30/2022   4:31 PM CST  To: DANYELL Day CNP  Subject: sedated ABR?                                     Isaiah Garcia,    I wanted to reach out and let you know that Kory is having a sedated procedure (dental work) done on 1/3/22.  I know it's short notice but I'm wondering if it is possible to coordinate a sedated ABR at the same time. Thank you! Thania Moses       Per your note 11/28/22:  Kory is a 4 year old male with a history of autism and eustachian tube dysfunction and sleep disordered breathing s/p PE tubes adenoidectomy. He has had 1-2 ear infections over the summer, but has otherwise done well with no episodes of otorrhea or concerns for hearing loss. He does rub his ears sometimes. We have had a difficult time obtaining audiogram the past few times. He did not do well with anesthesia in the past so mom is hesitant for sedated ABR. He does have a middle ear effusion on the right. Recommend a course of Flonase and mineral oil. Follow up in 2-3 months with audiogram. Will consider sedated EUA and audiogram if other procedures are recommended.       cognition/pain/decreased strength

## 2024-10-12 NOTE — DIETITIAN INITIAL EVALUATION ADULT - PERTINENT LABORATORY DATA
10-12    133[L]  |  102  |  17  ----------------------------<  117[H]  3.8   |  27  |  0.57    Ca    8.8      12 Oct 2024 09:37  Phos  3.0     10-12  Mg     1.8     10-12    TPro  6.7  /  Alb  2.5[L]  /  TBili  0.8  /  DBili  x   /  AST  22  /  ALT  14  /  AlkPhos  74  10-12

## 2024-10-12 NOTE — H&P ADULT - NSHPLABSRESULTS_GEN_ALL_CORE
11.8   10.15 )-----------( 182      ( 12 Oct 2024 00:37 )             36.2   10-12    136  |  103  |  20  ----------------------------<  122[H]  3.6   |  25  |  0.50    Ca    9.1      12 Oct 2024 00:37    TPro  6.7  /  Alb  2.5[L]  /  TBili  0.8  /  DBili  x   /  AST  22  /  ALT  14  /  AlkPhos  74  10-12    Radio:  Mildly displaced fractures involving the left anterior and superior pubic   rami, with extension to the anterior left acetabulum. Nondisplaced   fractures involving the right superior and inferior pubic rami.    CHEST:  LUNGS AND LARGE AIRWAYS: Patent central airways. Mild-to moderate chronic   appearing interstitial lung changes. Mild interlobular septal thickening   with predominant upper lobe distribution most likely related to   developing pulmonary interstitial edema.. Dependent atelectasis.   Cicatricial atelectasis involving the medial segment of the right middle   lobe. No suspicious lung nodules. No evidence of a pneumothorax.  PLEURA: Trace right pleural effusion and associated compressive   atelectasis.  VESSELS: Extensive calcified atherosclerosis of the aorta, coronary   arteries, and other arterial branches.  HEART: Heart size is normal. No pericardial effusion.  MEDIASTINUM AND VLAD: No lymphadenopathy.  CHEST WALL AND LOWER NECK: Within normal limits.    ABDOMEN AND PELVIS:  LIVER: Within normal limits.  BILE DUCTS: Normal caliber.  GALLBLADDER: Within normal limits.  SPLEEN: Within normal limits.  PANCREAS: Mildly atrophicand infiltrated by fat.  ADRENALS: Within normal limits.  KIDNEYS/URETERS: Punctate-sized nonobstructive left nephrolithiasis.   Hypodense lesions in the kidneys are incompletely assessed but   statistically favored to represent cysts; outpatient ultrasound for   confirmation.    BLADDER: Minimally distended. Incompletely assessed in this exam.  REPRODUCTIVE ORGANS: Hysterectomy.    BOWEL: No bowel obstruction. Appendix is not visualized. No evidence of   inflammation in the pericecal region. Mild constipation.  PERITONEUM/RETROPERITONEUM: Within normal limits.  VESSELS: Extensive calcified atherosclerosis.  LYMPH NODES: No lymphadenopathy.  ABDOMINAL WALL: There are hemorrhagic contusions to the subcutaneous soft   tissues overlying the right posterolateral margin of the proximal right   femur; without an underlying fracture.  BONES: Degenerative changes. Spinal fusion surgical changes at the lower   lumbar levels from L3 through L5; no CT evident complications. Diffuse   osseous demineralization.    CT HEAD:  No evidence of acute intracranial pathology.    CT CERVICAL SPINE:  No acute fracture or traumatic subluxation.    Multi-level degenerative changes.

## 2024-10-12 NOTE — PHYSICAL THERAPY INITIAL EVALUATION ADULT - ACTIVE RANGE OF MOTION EXAMINATION, REHAB EVAL
except B Hip ROM NT due to pain/bilateral upper extremity Active ROM was WFL (within functional limits)/bilateral  lower extremity Active ROM was WFL (within functional limits)

## 2024-10-12 NOTE — H&P ADULT - ASSESSMENT
89 yo F s/p mechanical fall, with Mildly displaced fractures involving the left anterior and superior pubic rami, with extension to the anterior left acetabulum and nondisplaced fractures involving the right superior and inferior pubic rami.    Plan:  Admit to med/surg  Pain control PRN  Anti emetic PRN  Dvt ppx  DASH diet  PT eval  SW and CM on board  Hospitalist for comgmt  Ortho recs appreciated  WBAT, ambulate with assistance  Med rec to be done in the AM    d/w Dr. Dale

## 2024-10-12 NOTE — PATIENT PROFILE ADULT - FALL HARM RISK - HARM RISK INTERVENTIONS

## 2024-10-12 NOTE — PHYSICAL THERAPY INITIAL EVALUATION ADULT - MODALITIES TREATMENT COMMENTS
Pt returned to supine for rest after session, pt comfortable at rest, NAD, GERHARD, RN aware, +alarm,

## 2024-10-12 NOTE — H&P ADULT - HISTORY OF PRESENT ILLNESS
89 yo F w PMHx of Parkinsons, Lumbar fusion, and appendectomy coming in to ED after a mechanical fall walking up the stairs. The patient states she fell backwards when she lost balance and hit her head. Denies LOC. Daughter at bedside corroborated the story. Was previously on AC, but has since discontinued it for years. Denies chest pain, SOB, fevers or chills. No external signs of trauma. Pan scan negative, after calling radiology and reviewal of CT scan, bilateral pubic rami frx are noted.

## 2024-10-12 NOTE — DIETITIAN NUTRITION RISK NOTIFICATION - ADDITIONAL COMMENTS/DIETITIAN RECOMMENDATIONS
1. Consider liberalizing diet to regular to maximize caloric and nutrient intake.  2. Add ensure plus high protein BID to optimize PO intake (provides 350 kcal, 20g protein/ shake)   3. MVI w/ minerals daily to ensure 100% RDA met   4. Consider adding thiamine 100 mg daily 2/2 poor PO intake/ malnutrition   5. Monitor bowel movements, if no BM for >3 days, consider implementing bowel regimen.   6. Consider adding appetite stimulant such as Remeron or Marinol 2/2 chronically poor appetite/ PO intake   7. Monitor daily lytes/min and replete prn   8. Monitor wts weekly; track/trend changes  9. Confirm goals of care regarding nutrition support. Nutrition support is not recommended due to overall declining medical status which evidenced based studies indicate EN is not effective in prolonging survival and improving quality of life. It can also increase risk of aspiration pneumonia as well as other related issues (infection, GI complications, and worsening/ non-healing PI's). However, will provide nutrition/ hydration within GOC.

## 2024-10-12 NOTE — DIETITIAN INITIAL EVALUATION ADULT - OTHER INFO
89 yo F w PMHx of Parkinsons, Lumbar fusion, and appendectomy coming in to ED after a mechanical fall walking up the stairs. The patient states she fell backwards when she lost balance and hit her head. Denies LOC. Daughter at bedside corroborated the story. Was previously on AC, but has since discontinued it for years. Denies chest pain, SOB, fevers or chills. No external signs of trauma. Pan scan negative, after calling radiology and reviewal of CT scan, with Mildly displaced fractures involving the left anterior and superior pubic rami, with extension to the anterior left acetabulum and nondisplaced fractures involving the right superior and inferior pubic rami. no acute orthopedic interventions at this time. Admitting diagnosis: fracture of multiple pubic rami    Pt confused at time of RD visit, ? accuracy of information obtained. At time of RD visit pt had not eaten breakfast. Pt reports no change in appetite upon admission. Pt unsure of UBW, endorses wt loss of unknown amount. States "clothes fit looser". RD obtained bed scale wt 116# on 10/12/24; +1 edema doc'd possibly skewing wt.  Per EMR, admit wt 117# on 10/12/24. Pt appears thin, and frail. NFPE reveals mod-sev mucle/fat wasting. POCTs x24hrs WNL. Would consider liberalizing diet to regular to maximize caloric and nutrient intake. Add ensure plus high protein BID to optimize PO intake (provides 350 kcal, 20g protein/ shake). Would consider adding appetite stimulant such as Remeron or Marinol 2/2 chronically poor appetite/ PO intake. Confirm goals of care regarding nutrition support. Nutrition support is not recommended due to overall declining medical status which evidenced based studies indicate EN is not effective in prolonging survival and improving quality of life. It can also increase risk of aspiration pneumonia as well as other related issues (infection, GI complications, and worsening/ non-healing PI's). However, will provide nutrition/ hydration within GOC. See additional recommendations below.

## 2024-10-12 NOTE — H&P ADULT - ATTENDING COMMENTS
A/P:  B/L pelvic fractures with extension to left acetabulum  S/P trauma/fall stairs  Hospitalist consult for medical mgmt  Ortho on consult, no intervention, WBAT   Fall risk precautions  Pain control  Serial abd exams  F/U labs  SS for d/c planning  Physical therapy evaluation    75 minutes of time spent on pt examination, review of relevant labs and radiologic studies, assured stabilization of pt, discussion with relevant services/providers for coordination of pt care and services, time is exclusive of resident and/or physician assistant teaching or supervision time

## 2024-10-12 NOTE — H&P ADULT - NSHPPHYSICALEXAM_GEN_ALL_CORE
T(C): 36.2 (10-11-24 @ 21:16), Max: 36.2 (10-11-24 @ 21:16)  HR: 67 (10-12-24 @ 00:42) (67 - 77)  BP: 103/64 (10-12-24 @ 00:42) (103/64 - 137/73)  RR: 17 (10-12-24 @ 00:42) (17 - 18)  SpO2: 95% (10-12-24 @ 00:42) (90% - 95%)    CONSTITUTIONAL: Well groomed, NAD  EYES: PERRLA and symmetric, EOMI, No conjunctival or scleral injection, non-icteric  ENMT: Oral mucosa with moist membranes. Normal dentition; no pharyngeal injection or exudates  HEAD: NECK: Normocephalic, occiput laceration repaired by staples, neck supple, symmetric and without tracheal deviation   RESP: No respiratory distress, no use of accessory muscles  CV: NSR, no tachycardia  GI: Soft, NT, ND, no rebound, no guarding; no palpable masses; no hepatosplenomegaly; no hernia palpated  MSK: Normal gait; Normal ROM without pain, no spinal tenderness, normal muscle strength/tone, pain at the hip, no pelvic instability  SKIN: No rashes or ulcers noted; no subcutaneous nodules or induration palpable  NEURO: CN II-XII intact; normal reflexes in upper and lower extremities, sensation intact in upper and lower extremities b/l to light touch   PSYCH: Appropriate insight/judgment; A+O x 3, mood and affect appropriate, recent/remote memory intact T(C): 36.2 (10-11-24 @ 21:16), Max: 36.2 (10-11-24 @ 21:16)  HR: 67 (10-12-24 @ 00:42) (67 - 77)  BP: 103/64 (10-12-24 @ 00:42) (103/64 - 137/73)  RR: 17 (10-12-24 @ 00:42) (17 - 18)  SpO2: 95% (10-12-24 @ 00:42) (90% - 95%)    CONSTITUTIONAL: Well groomed, NAD  EYES: PERRLA and symmetric, EOMI, No conjunctival or scleral injection, non-icteric  ENMT: Oral mucosa with moist membranes. Normal dentition; no pharyngeal injection or exudates  HEAD: NECK: Normocephalic, occiput laceration repaired by staples, neck supple, symmetric and without tracheal deviation   RESP: No respiratory distress, no use of accessory muscles  CV: NSR, no tachycardia  GI: Soft, NT, ND, no rebound, no guarding; no palpable masses; no hepatosplenomegaly; no hernia palpated  MSK: Normal gait; Normal ROM without pain, no spinal tenderness, normal muscle strength/tone, pain at the hip, no pelvic instability  SKIN: No rashes or ulcers noted; no subcutaneous nodules or induration palpable, LLE chronic venous stasis wound noted. Minor bruising in the Upper extremities noted  NEURO: CN II-XII intact; normal reflexes in upper and lower extremities, sensation intact in upper and lower extremities b/l to light touch   PSYCH: Appropriate insight/judgment; A+O x 3, mood and affect appropriate, recent/remote memory intact T(C): 36.2 (10-11-24 @ 21:16), Max: 36.2 (10-11-24 @ 21:16)  HR: 67 (10-12-24 @ 00:42) (67 - 77)  BP: 103/64 (10-12-24 @ 00:42) (103/64 - 137/73)  RR: 17 (10-12-24 @ 00:42) (17 - 18)  SpO2: 95% (10-12-24 @ 00:42) (90% - 95%)    CONSTITUTIONAL: Well groomed, NAD  EYES: PERRLA and symmetric, EOMI, No conjunctival or scleral injection, non-icteric  ENMT: Oral mucosa with moist membranes. Normal dentition; no pharyngeal injection or exudates  HEAD: NECK: Normocephalic, occiput laceration repaired by staples, neck supple, symmetric and without tracheal deviation   RESP: No respiratory distress, no use of accessory muscles  CV: NSR, no tachycardia  GI: Soft, NT, ND, no rebound, no guarding; no palpable masses; no hepatosplenomegaly; no hernia palpated  MSK: Normal ROM without pain, no spinal tenderness, normal muscle strength/tone, pain at the hip, no pelvic instability, known b/l plvic fractures  SKIN: No rashes or ulcers noted; no subcutaneous nodules or induration palpable, LLE chronic venous stasis wound noted. Minor bruising in the Upper extremities noted  NEURO: CN II-XII intact; normal reflexes in upper and lower extremities, sensation intact in upper and lower extremities b/l to light touch   PSYCH: Appropriate insight/judgment; A+O x 3, mood and affect appropriate, recent/remote memory intact

## 2024-10-12 NOTE — PHYSICAL THERAPY INITIAL EVALUATION ADULT - GENERAL OBSERVATIONS, REHAB EVAL
Pt seen on 2N, NAD, willing and cooperative, Alert and Oriented to self, place, and month, pt knew she fell, but appears confused. No reported pain at rest and pt moaned w/ movement

## 2024-10-12 NOTE — DIETITIAN INITIAL EVALUATION ADULT - ORAL INTAKE PTA/DIET HISTORY
Pt is confused at time of RD interview ?  accuracy of information obtained. Pt states she now lives with her daughter. Daughter does the food shopping and cooking. States appetite is "okay"; eats ~2 meals a day. Likely consuming <75% ENN chronically. Reports some constipation, believes its from new medications she is on.

## 2024-10-12 NOTE — PHYSICAL THERAPY INITIAL EVALUATION ADULT - PERTINENT HX OF CURRENT PROBLEM, REHAB EVAL
As per chart, 89 yo F w   to ED s/p mechanical fall walking up the stairs,  fell backwards when she lost balance and hit her head. Denies LOC,  radiology- review l of CT scan, bilateral pubic rami frx   Mildly displaced fractures involving the left anterior and superior pubic rami, with extension to the anterior left acetabulum and nondisplaced fractures involving the right superior and inferior pubic rami  PMH Parkinson's, Lumbar fusion, and appendectomy

## 2024-10-13 DIAGNOSIS — Z90.49 ACQUIRED ABSENCE OF OTHER SPECIFIED PARTS OF DIGESTIVE TRACT: Chronic | ICD-10-CM

## 2024-10-13 DIAGNOSIS — Z98.1 ARTHRODESIS STATUS: Chronic | ICD-10-CM

## 2024-10-13 LAB
ADD ON TEST-SPECIMEN IN LAB: SIGNIFICANT CHANGE UP
ANION GAP SERPL CALC-SCNC: 5 MMOL/L — SIGNIFICANT CHANGE UP (ref 5–17)
BASOPHILS # BLD AUTO: 0.05 K/UL — SIGNIFICANT CHANGE UP (ref 0–0.2)
BASOPHILS NFR BLD AUTO: 0.6 % — SIGNIFICANT CHANGE UP (ref 0–2)
BUN SERPL-MCNC: 16 MG/DL — SIGNIFICANT CHANGE UP (ref 7–23)
CALCIUM SERPL-MCNC: 8.6 MG/DL — SIGNIFICANT CHANGE UP (ref 8.5–10.1)
CHLORIDE SERPL-SCNC: 106 MMOL/L — SIGNIFICANT CHANGE UP (ref 96–108)
CO2 SERPL-SCNC: 25 MMOL/L — SIGNIFICANT CHANGE UP (ref 22–31)
CREAT SERPL-MCNC: 0.54 MG/DL — SIGNIFICANT CHANGE UP (ref 0.5–1.3)
EGFR: 87 ML/MIN/1.73M2 — SIGNIFICANT CHANGE UP
EOSINOPHIL # BLD AUTO: 0.14 K/UL — SIGNIFICANT CHANGE UP (ref 0–0.5)
EOSINOPHIL NFR BLD AUTO: 1.7 % — SIGNIFICANT CHANGE UP (ref 0–6)
GLUCOSE SERPL-MCNC: 111 MG/DL — HIGH (ref 70–99)
HCT VFR BLD CALC: 35.4 % — SIGNIFICANT CHANGE UP (ref 34.5–45)
HGB BLD-MCNC: 11.1 G/DL — LOW (ref 11.5–15.5)
IMM GRANULOCYTES NFR BLD AUTO: 0.5 % — SIGNIFICANT CHANGE UP (ref 0–0.9)
LYMPHOCYTES # BLD AUTO: 0.77 K/UL — LOW (ref 1–3.3)
LYMPHOCYTES # BLD AUTO: 9.3 % — LOW (ref 13–44)
MAGNESIUM SERPL-MCNC: 2 MG/DL — SIGNIFICANT CHANGE UP (ref 1.6–2.6)
MCHC RBC-ENTMCNC: 27.2 PG — SIGNIFICANT CHANGE UP (ref 27–34)
MCHC RBC-ENTMCNC: 31.4 GM/DL — LOW (ref 32–36)
MCV RBC AUTO: 86.8 FL — SIGNIFICANT CHANGE UP (ref 80–100)
MONOCYTES # BLD AUTO: 0.46 K/UL — SIGNIFICANT CHANGE UP (ref 0–0.9)
MONOCYTES NFR BLD AUTO: 5.6 % — SIGNIFICANT CHANGE UP (ref 2–14)
NEUTROPHILS # BLD AUTO: 6.81 K/UL — SIGNIFICANT CHANGE UP (ref 1.8–7.4)
NEUTROPHILS NFR BLD AUTO: 82.3 % — HIGH (ref 43–77)
PHOSPHATE SERPL-MCNC: 2.4 MG/DL — LOW (ref 2.5–4.5)
PLATELET # BLD AUTO: 174 K/UL — SIGNIFICANT CHANGE UP (ref 150–400)
POTASSIUM SERPL-MCNC: 4.3 MMOL/L — SIGNIFICANT CHANGE UP (ref 3.5–5.3)
POTASSIUM SERPL-SCNC: 4.3 MMOL/L — SIGNIFICANT CHANGE UP (ref 3.5–5.3)
RBC # BLD: 4.08 M/UL — SIGNIFICANT CHANGE UP (ref 3.8–5.2)
RBC # FLD: 14 % — SIGNIFICANT CHANGE UP (ref 10.3–14.5)
SODIUM SERPL-SCNC: 136 MMOL/L — SIGNIFICANT CHANGE UP (ref 135–145)
WBC # BLD: 8.27 K/UL — SIGNIFICANT CHANGE UP (ref 3.8–10.5)
WBC # FLD AUTO: 8.27 K/UL — SIGNIFICANT CHANGE UP (ref 3.8–10.5)

## 2024-10-13 PROCEDURE — 99232 SBSQ HOSP IP/OBS MODERATE 35: CPT

## 2024-10-13 PROCEDURE — 99233 SBSQ HOSP IP/OBS HIGH 50: CPT

## 2024-10-13 RX ORDER — SOD PHOS DI, MONO/K PHOS MONO 250 MG
1 TABLET ORAL
Refills: 0 | Status: COMPLETED | OUTPATIENT
Start: 2024-10-13 | End: 2024-10-14

## 2024-10-13 RX ORDER — SOD PHOS DI, MONO/K PHOS MONO 250 MG
1 TABLET ORAL ONCE
Refills: 0 | Status: COMPLETED | OUTPATIENT
Start: 2024-10-13 | End: 2024-10-13

## 2024-10-13 RX ORDER — ENOXAPARIN SODIUM 150 MG/ML
40 INJECTION SUBCUTANEOUS
Qty: 0 | Refills: 0 | DISCHARGE
Start: 2024-10-13 | End: 2024-11-10

## 2024-10-13 RX ADMIN — SERTRALINE HYDROCHLORIDE 100 MILLIGRAM(S): 100 TABLET, FILM COATED ORAL at 10:47

## 2024-10-13 RX ADMIN — GABAPENTIN 100 MILLIGRAM(S): 800 TABLET, FILM COATED ORAL at 06:22

## 2024-10-13 RX ADMIN — GABAPENTIN 100 MILLIGRAM(S): 800 TABLET, FILM COATED ORAL at 13:47

## 2024-10-13 RX ADMIN — ENOXAPARIN SODIUM 40 MILLIGRAM(S): 150 INJECTION SUBCUTANEOUS at 10:46

## 2024-10-13 RX ADMIN — Medication 1 PACKET(S): at 22:30

## 2024-10-13 RX ADMIN — LIDOCAINE 1 PATCH: 50 CREAM TOPICAL at 06:22

## 2024-10-13 RX ADMIN — Medication 1 TABLET(S): at 06:22

## 2024-10-13 RX ADMIN — Medication 1 TABLET(S): at 22:30

## 2024-10-13 RX ADMIN — GABAPENTIN 100 MILLIGRAM(S): 800 TABLET, FILM COATED ORAL at 22:30

## 2024-10-13 RX ADMIN — LIDOCAINE 1 PATCH: 50 CREAM TOPICAL at 17:06

## 2024-10-13 RX ADMIN — LIDOCAINE 1 PATCH: 50 CREAM TOPICAL at 07:16

## 2024-10-13 RX ADMIN — Medication 1 PACKET(S): at 10:46

## 2024-10-13 RX ADMIN — Medication 1 TABLET(S): at 13:47

## 2024-10-13 NOTE — PROGRESS NOTE ADULT - ASSESSMENT
89 yo F s/p mechanical fall, with Mildly displaced fractures involving the left anterior and superior pubic rami, with extension to the anterior left acetabulum and nondisplaced fractures involving the right superior and inferior pubic rami  doing well  seen by PT    Plan:  cont diet  Pain control PRN  Anti emetic PRN  PT eval appreciated- roller walker  SW and CM on board  Hospitalist for comgmt  Ortho recs appreciated  WBAT, ambulate with assistance  DVT ppx  SW/CM for dispo    d/w attending

## 2024-10-13 NOTE — PROGRESS NOTE ADULT - ATTENDING COMMENTS
A/P:  B/L pelvic fractures with extension to left acetabulum  S/P trauma/fall stairs  Hospitalist on consult for medical mgmt  Ortho on consult, no intervention, WBAT   Fall risk precautions  Pain control  Serial abd exams stable  F/U labs  SS for d/c planning  Physical therapy evaluation  Cont current care and meds    35 minutes of time spent on pt examination, review of relevant labs and radiologic studies, assured stabilization of pt, discussion with relevant services/providers for coordination of pt care and services, time is exclusive of resident and/or physician assistant teaching or supervision time .

## 2024-10-13 NOTE — PROGRESS NOTE ADULT - SUBJECTIVE AND OBJECTIVE BOX
TERTIARY TRAUMA SURVEY  ------------------------------------------------------------------------------------    HPI:  89 yo F w PMHx of Parkinsons, Lumbar fusion, and appendectomy coming in to ED after a mechanical fall walking up the stairs. The patient states she fell backwards when she lost balance and hit her head. Denies LOC. Daughter at bedside corroborated the story. Was previously on AC, but has since discontinued it for years. Denies chest pain, SOB, fevers or chills. No external signs of trauma. Pan scan negative, after calling radiology and reviewal of CT scan, bilateral pubic rami frx are noted.  (12 Oct 2024 01:09)    Subjective:  Pt seen and examined at bedside this morning. Pt is AAOx3, pt in no acute distress. Pt denies fever, chills, chest pain, SOB, abd pain, N/V/D, extremity pain or dysfunction, hemoptysis, hematemesis, hematuria, hematochexia, headache, diplopia, vertigo, dizzyness. Pt tolerating diet, (+) void and seen by PT and recommend roller walker             PAST MEDICAL & SURGICAL HISTORY:      FAMILY HISTORY:      ALLERGIES: No Known Allergies      CURRENT MEDICATIONS  acetaminophen     Tablet .. 650 milliGRAM(s) Oral every 6 hours PRN  carbidopa/levodopa  25/100 1 Tablet(s) Oral three times a day  cyclobenzaprine 5 milliGRAM(s) Oral three times a day PRN  enoxaparin Injectable 40 milliGRAM(s) SubCutaneous every 24 hours  gabapentin 100 milliGRAM(s) Oral three times a day  lidocaine   4% Patch 1 Patch Transdermal every 24 hours  ondansetron Injectable 4 milliGRAM(s) IV Push every 6 hours PRN  oxyCODONE    IR 5 milliGRAM(s) Oral Once PRN  oxyCODONE    IR 2.5 milliGRAM(s) Oral every 6 hours PRN  sertraline 100 milliGRAM(s) Oral daily  sodium chloride 0.9%. 1000 milliLiter(s) IV Continuous <Continuous>    -----------------------------------------------------------------------------------    VITAL SIGNS:  T(C): 37 (10-13-24 @ 00:21), Max: 37 (10-13-24 @ 00:21)  HR: 99 (10-13-24 @ 00:21) (61 - 107)  BP: 121/64 (10-13-24 @ 00:21)  RR: 18 (10-13-24 @ 00:21) (16 - 18)  SpO2: 87% (10-13-24 @ 00:21) (87% - 95%)    10-11-24 @ 07:01  -  10-12-24 @ 07:00  --------------------------------------------------------  IN: 50 mL / OUT: 0 mL / NET: 50 mL    10-12-24 @ 07:01  -  10-13-24 @ 01:24  --------------------------------------------------------  IN: 980 mL / OUT: 200 mL / NET: 780 mL      Weight (kg): 53.4 (10-12-24 @ 09:42)    PHYSICAL EXAM:   GCS of 15  Airway is patent  Breathing is symmetric and unlabored  Neuro: CNII-XII grossly intact  Psych: normal affect  HEENT: Normocephalic, atraumatic, SAM, EOM wnl, no otorrhea or hemotympanum b/l, no epistaxis or d/c b/l nares, no craniofacial bony pathology or tenderness b/l  Neck: No crepitus, no ecchymosis, no hematoma, to exam, no JVD, no tracheal deviation  Cspine/thoracolumbrosacral spine: no gross bony pathology or tenderness to exam  Cardiovascular: S1S2 Present  Chest: no gross rib pathology or tenderness to exam. No sternal pathology or tenderness to exam. No crepitus, no ecchymosis, no hematoma. No penetrating thorcoabdominal trauma  Respiratory: Rate is 18; Respiratory Effort normal; no wheezes, rales or rhonchi to exam  ABD: bowel sounds (+), soft, nontender, non distended, no rebound, no guarding, no rigidity, no skin changes to exam. No pelvic instability to exam, no skin changes  Musculoskeletal: Pt has palpable b/l radial, femoral, dorsalis pedis pulses. All digits are warm and well perfused. No gross long bone pathology or tenderness to exam. Pt demonstrates grossly intact sensoromotor function. Pt has good capillary refill to digits, no calf edema or tenderness to exam.  Skin: no lesions or rashes to exam    LABS:      MICROBIOLOGY:      ------------------------------------------------------------------------------------------  RADIOLOGICAL FINDINGS REVIEWED TERTIARY TRAUMA SURVEY  ------------------------------------------------------------------------------------  CC: Patient is a 89y old  Female who presents with a chief complaint of s/p mechanical fall (13 Oct 2024 01:24)    HPI:  89 yo F w PMHx of Parkinsons, Lumbar fusion, and appendectomy coming in to ED after a mechanical fall walking up the stairs. The patient states she fell backwards when she lost balance and hit her head. Denies LOC. Daughter at bedside corroborated the story. Was previously on AC, but has since discontinued it for years. Denies chest pain, SOB, fevers or chills. No external signs of trauma. Pan scan negative, after calling radiology and reviewal of CT scan, bilateral pubic rami frx are noted.  (12 Oct 2024 01:09)    Subjective:  Pt seen and examined at bedside this morning. Pt is AAOx3, pt in no acute distress. Pt denies fever, chills, chest pain, SOB, abd pain, N/V/D, extremity pain or dysfunction, hemoptysis, hematemesis, hematuria, hematochexia, headache, diplopia, vertigo, dizzyness. Pt tolerating diet, (+) void and seen by PT and recommend roller walker       ROS: as above otherwise negative      PAST MEDICAL & SURGICAL HISTORY:      FAMILY HISTORY:      ALLERGIES: No Known Allergies      CURRENT MEDICATIONS  acetaminophen     Tablet .. 650 milliGRAM(s) Oral every 6 hours PRN  carbidopa/levodopa  25/100 1 Tablet(s) Oral three times a day  cyclobenzaprine 5 milliGRAM(s) Oral three times a day PRN  enoxaparin Injectable 40 milliGRAM(s) SubCutaneous every 24 hours  gabapentin 100 milliGRAM(s) Oral three times a day  lidocaine   4% Patch 1 Patch Transdermal every 24 hours  ondansetron Injectable 4 milliGRAM(s) IV Push every 6 hours PRN  oxyCODONE    IR 5 milliGRAM(s) Oral Once PRN  oxyCODONE    IR 2.5 milliGRAM(s) Oral every 6 hours PRN  sertraline 100 milliGRAM(s) Oral daily  sodium chloride 0.9%. 1000 milliLiter(s) IV Continuous <Continuous>    -----------------------------------------------------------------------------------    VITAL SIGNS:  T(C): 37 (10-13-24 @ 00:21), Max: 37 (10-13-24 @ 00:21)  HR: 99 (10-13-24 @ 00:21) (61 - 107)  BP: 121/64 (10-13-24 @ 00:21)  RR: 18 (10-13-24 @ 00:21) (16 - 18)  SpO2: 87% (10-13-24 @ 00:21) (87% - 95%)    10-11-24 @ 07:01  -  10-12-24 @ 07:00  --------------------------------------------------------  IN: 50 mL / OUT: 0 mL / NET: 50 mL    10-12-24 @ 07:01  -  10-13-24 @ 01:24  --------------------------------------------------------  IN: 980 mL / OUT: 200 mL / NET: 780 mL      Weight (kg): 53.4 (10-12-24 @ 09:42)    PHYSICAL EXAM:   GCS of 15  Airway is patent  Breathing is symmetric and unlabored  Neuro: CNII-XII grossly intact  Psych: normal affect  HEENT: Normocephalic, atraumatic, SAM, EOM wnl, no otorrhea or hemotympanum b/l, no epistaxis or d/c b/l nares, no craniofacial bony pathology or tenderness b/l  Neck: No crepitus, no ecchymosis, no hematoma, to exam, no JVD, no tracheal deviation  Cspine/thoracolumbrosacral spine: no gross bony pathology or tenderness to exam  Cardiovascular: S1S2 Present  Chest: no gross rib pathology or tenderness to exam. No sternal pathology or tenderness to exam. No crepitus, no ecchymosis, no hematoma.   Respiratory: Rate is 18; Respiratory Effort normal; no wheezes, rales or rhonchi to exam  ABD: bowel sounds (+), soft, nontender, non distended, no rebound, no guarding, no rigidity, no skin changes to exam. No pelvic instability to exam, known b/l pelvic fractures  Musculoskeletal: Pt has palpable b/l radial, femoral, dorsalis pedis pulses. All digits are warm and well perfused. No gross long bone pathology or tenderness to exam. Pt demonstrates grossly intact sensoromotor function to limited exam. Pt has good capillary refill to digits, no calf edema or tenderness to exam.  Skin: no lesions or rashes to exam    LABS:      MICROBIOLOGY:      ------------------------------------------------------------------------------------------  RADIOLOGICAL FINDINGS REVIEWED

## 2024-10-13 NOTE — PROVIDER CONTACT NOTE (OTHER) - ASSESSMENT
No cardio / respiratory distress noted. Other VS WNL. Patient lacks adequate cognition due to baseline mental confusion to appropriately utilize and benefit from incentive spirometry

## 2024-10-13 NOTE — PROGRESS NOTE ADULT - SUBJECTIVE AND OBJECTIVE BOX
Chart and meds reviewed.  Patient seen and examined.    10/13 -     All other systems reviewed and found to be negative with the exception of what has been described above.    MEDICATIONS  (STANDING):  carbidopa/levodopa  25/100 1 Tablet(s) Oral three times a day  enoxaparin Injectable 40 milliGRAM(s) SubCutaneous every 24 hours  gabapentin 100 milliGRAM(s) Oral three times a day  lidocaine   4% Patch 1 Patch Transdermal every 24 hours  sertraline 100 milliGRAM(s) Oral daily  sodium chloride 0.9%. 1000 milliLiter(s) (50 mL/Hr) IV Continuous <Continuous>    MEDICATIONS  (PRN):  acetaminophen     Tablet .. 650 milliGRAM(s) Oral every 6 hours PRN Temp greater or equal to 38C (100.4F), Mild Pain (1 - 3)  cyclobenzaprine 5 milliGRAM(s) Oral three times a day PRN Muscle Spasm  ondansetron Injectable 4 milliGRAM(s) IV Push every 6 hours PRN Nausea  oxyCODONE    IR 5 milliGRAM(s) Oral Once PRN Severe Pain (7 - 10)  oxyCODONE    IR 2.5 milliGRAM(s) Oral every 6 hours PRN Moderate Pain (4 - 6)    VITAL SIGNS:  T(F): 97.7 (10-13-24 @ 07:50), Max: 98.6 (10-13-24 @ 00:21)  HR: 94 (10-13-24 @ 07:50) (94 - 106)  BP: 116/71 (10-13-24 @ 07:50) (112/50 - 121/64)  RR: 18 (10-13-24 @ 07:50) (18 - 18)  SpO2: 95% (10-13-24 @ 07:50) (87% - 95%)    I&O's Summary    12 Oct 2024 07:01  -  13 Oct 2024 07:00  --------------------------------------------------------  IN: 980 mL / OUT: 200 mL / NET: 780 mL    PHYSICAL EXAM:  HEENT:  pupils equal and reactive, EOMI, no oropharyngeal lesions, erythema, exudates, oral thrush  NECK:   supple, no carotid bruits  CV:  +S1, +S2, regular, no murmurs  RESP:   lungs clear to auscultation bilaterally, no wheezing, rales, rhonchi, good air entry bilaterally  GI:  abdomen soft, non-tender, non-distended, normal BS  EXT:  no clubbing, no cyanosis, no edema, no calf pain, swelling or erythema  NEURO:  AAOX3, no focal neurological deficits, follows all commands, able to move extremities spontaneously  SKIN:  no ulcers, lesions or rashes    LABS:                        11.1   8.27  )-----------( 174      ( 13 Oct 2024 07:29 )             35.4     10-13    136  |  106  |  16  ----------------------------<  111[H]  4.3   |  25  |  0.54    Ca    8.6      13 Oct 2024 07:29  Phos  2.4     10-13  Mg     2.0     10-13    TPro  6.7  /  Alb  2.5[L]  /  TBili  0.8  /  DBili  x   /  AST  22  /  ALT  14  /  AlkPhos  74  10-12    LIVER FUNCTIONS - ( 12 Oct 2024 00:37 )  Alb: 2.5 g/dL / Pro: 6.7 gm/dL / ALK PHOS: 74 U/L / ALT: 14 U/L / AST: 22 U/L / GGT: x           PT/INR - ( 12 Oct 2024 00:37 )   PT: 13.1 sec;   INR: 1.11 ratio       Chart and meds reviewed.  Patient seen and examined.    10/13 - patient admitted with a mechanical fall resulting in bilateral pelvic fractures.  She denies any chest pain, shortness of breath, nausea or vomiting at this time    All other systems reviewed and found to be negative with the exception of what has been described above.    MEDICATIONS  (STANDING):  carbidopa/levodopa  25/100 1 Tablet(s) Oral three times a day  enoxaparin Injectable 40 milliGRAM(s) SubCutaneous every 24 hours  gabapentin 100 milliGRAM(s) Oral three times a day  lidocaine   4% Patch 1 Patch Transdermal every 24 hours  sertraline 100 milliGRAM(s) Oral daily  sodium chloride 0.9%. 1000 milliLiter(s) (50 mL/Hr) IV Continuous <Continuous>    MEDICATIONS  (PRN):  acetaminophen     Tablet .. 650 milliGRAM(s) Oral every 6 hours PRN Temp greater or equal to 38C (100.4F), Mild Pain (1 - 3)  cyclobenzaprine 5 milliGRAM(s) Oral three times a day PRN Muscle Spasm  ondansetron Injectable 4 milliGRAM(s) IV Push every 6 hours PRN Nausea  oxyCODONE    IR 5 milliGRAM(s) Oral Once PRN Severe Pain (7 - 10)  oxyCODONE    IR 2.5 milliGRAM(s) Oral every 6 hours PRN Moderate Pain (4 - 6)    VITAL SIGNS:  T(F): 97.7 (10-13-24 @ 07:50), Max: 98.6 (10-13-24 @ 00:21)  HR: 94 (10-13-24 @ 07:50) (94 - 106)  BP: 116/71 (10-13-24 @ 07:50) (112/50 - 121/64)  RR: 18 (10-13-24 @ 07:50) (18 - 18)  SpO2: 95% (10-13-24 @ 07:50) (87% - 95%)    I&O's Summary    12 Oct 2024 07:01  -  13 Oct 2024 07:00  --------------------------------------------------------  IN: 980 mL / OUT: 200 mL / NET: 780 mL    PHYSICAL EXAM:  HEENT:  pupils equal and reactive, EOMI, no oropharyngeal lesions, erythema, exudates, oral thrush  NECK:   supple, no carotid bruits  CV:  +S1, +S2, regular, no murmurs  RESP:   lungs clear to auscultation bilaterally, no wheezing, rales, rhonchi, good air entry bilaterally  GI:  abdomen soft, non-tender, non-distended, normal BS  EXT:  no clubbing, no cyanosis, no edema, no calf pain, swelling or erythema  NEURO:  AAOX3, no focal neurological deficits, follows all commands, able to move extremities spontaneously  SKIN:  no ulcers, lesions or rashes    LABS:                        11.1   8.27  )-----------( 174      ( 13 Oct 2024 07:29 )             35.4     10-13    136  |  106  |  16  ----------------------------<  111[H]  4.3   |  25  |  0.54    Ca    8.6      13 Oct 2024 07:29  Phos  2.4     10-13  Mg     2.0     10-13    TPro  6.7  /  Alb  2.5[L]  /  TBili  0.8  /  DBili  x   /  AST  22  /  ALT  14  /  AlkPhos  74  10-12    LIVER FUNCTIONS - ( 12 Oct 2024 00:37 )  Alb: 2.5 g/dL / Pro: 6.7 gm/dL / ALK PHOS: 74 U/L / ALT: 14 U/L / AST: 22 U/L / GGT: x           PT/INR - ( 12 Oct 2024 00:37 )   PT: 13.1 sec;   INR: 1.11 ratio

## 2024-10-13 NOTE — PROGRESS NOTE ADULT - ASSESSMENT
89 F with Hx of Parkinsons, Lumbar fusion, and appendectomy was admitted after a mechanical fall walking up the stairs complicated by mildly displaced bilateral pelvic fractures.    Acute Mechanical Fall with Bilateral Pelvic Fractures with Extension to the Left Acetabulum  -  pain control  -  monitor H/H  -  OOB to chair  -  fall precautions  -  PT as tolerated  -  no surgical intervention  -  check Vitamin D level    Parkinsons Disease  -  stable, continue Sinemet  -  fall precautions    Depression  -  continue Sertraline    DVT prophylaxis  -  venodynes and Lovenox    Dispo:  will need rehab this week   89 F with Hx of Parkinsons, Lumbar fusion, and appendectomy was admitted after a mechanical fall walking up the stairs complicated by mildly displaced bilateral pelvic fractures.    Acute Mechanical Fall with Bilateral Pelvic Fractures with Extension to the Left Acetabulum  -  pain control  -  monitor H/H  -  OOB to chair  -  fall precautions  -  PT as tolerated  -  no surgical intervention  -  check Vitamin D level    Parkinsons Disease  -  stable, continue Sinemet  -  fall precautions    Depression  -  continue Sertraline    Hyponatremia  -  resolved    Hypophosphatemia  -  replete today    DVT prophylaxis  -  venodynes and Lovenox    Dispo:  PT is recommended [facility], will arrange for this week

## 2024-10-14 LAB
24R-OH-CALCIDIOL SERPL-MCNC: 17.6 NG/ML — LOW (ref 30–80)
ANION GAP SERPL CALC-SCNC: 5 MMOL/L — SIGNIFICANT CHANGE UP (ref 5–17)
BASOPHILS # BLD AUTO: 0.05 K/UL — SIGNIFICANT CHANGE UP (ref 0–0.2)
BASOPHILS NFR BLD AUTO: 0.6 % — SIGNIFICANT CHANGE UP (ref 0–2)
BUN SERPL-MCNC: 17 MG/DL — SIGNIFICANT CHANGE UP (ref 7–23)
CALCIUM SERPL-MCNC: 8.4 MG/DL — LOW (ref 8.5–10.1)
CHLORIDE SERPL-SCNC: 103 MMOL/L — SIGNIFICANT CHANGE UP (ref 96–108)
CO2 SERPL-SCNC: 24 MMOL/L — SIGNIFICANT CHANGE UP (ref 22–31)
CREAT SERPL-MCNC: 0.48 MG/DL — LOW (ref 0.5–1.3)
EGFR: 89 ML/MIN/1.73M2 — SIGNIFICANT CHANGE UP
EOSINOPHIL # BLD AUTO: 0.15 K/UL — SIGNIFICANT CHANGE UP (ref 0–0.5)
EOSINOPHIL NFR BLD AUTO: 1.9 % — SIGNIFICANT CHANGE UP (ref 0–6)
GLUCOSE SERPL-MCNC: 108 MG/DL — HIGH (ref 70–99)
HCT VFR BLD CALC: 33.8 % — LOW (ref 34.5–45)
HGB BLD-MCNC: 10.8 G/DL — LOW (ref 11.5–15.5)
IMM GRANULOCYTES NFR BLD AUTO: 0.6 % — SIGNIFICANT CHANGE UP (ref 0–0.9)
LYMPHOCYTES # BLD AUTO: 1.01 K/UL — SIGNIFICANT CHANGE UP (ref 1–3.3)
LYMPHOCYTES # BLD AUTO: 12.7 % — LOW (ref 13–44)
MAGNESIUM SERPL-MCNC: 2.1 MG/DL — SIGNIFICANT CHANGE UP (ref 1.6–2.6)
MCHC RBC-ENTMCNC: 27.4 PG — SIGNIFICANT CHANGE UP (ref 27–34)
MCHC RBC-ENTMCNC: 32 GM/DL — SIGNIFICANT CHANGE UP (ref 32–36)
MCV RBC AUTO: 85.8 FL — SIGNIFICANT CHANGE UP (ref 80–100)
MONOCYTES # BLD AUTO: 0.56 K/UL — SIGNIFICANT CHANGE UP (ref 0–0.9)
MONOCYTES NFR BLD AUTO: 7 % — SIGNIFICANT CHANGE UP (ref 2–14)
NEUTROPHILS # BLD AUTO: 6.14 K/UL — SIGNIFICANT CHANGE UP (ref 1.8–7.4)
NEUTROPHILS NFR BLD AUTO: 77.2 % — HIGH (ref 43–77)
PHOSPHATE SERPL-MCNC: 3 MG/DL — SIGNIFICANT CHANGE UP (ref 2.5–4.5)
PLATELET # BLD AUTO: 177 K/UL — SIGNIFICANT CHANGE UP (ref 150–400)
POTASSIUM SERPL-MCNC: 4.3 MMOL/L — SIGNIFICANT CHANGE UP (ref 3.5–5.3)
POTASSIUM SERPL-SCNC: 4.3 MMOL/L — SIGNIFICANT CHANGE UP (ref 3.5–5.3)
RBC # BLD: 3.94 M/UL — SIGNIFICANT CHANGE UP (ref 3.8–5.2)
RBC # FLD: 14 % — SIGNIFICANT CHANGE UP (ref 10.3–14.5)
SODIUM SERPL-SCNC: 132 MMOL/L — LOW (ref 135–145)
WBC # BLD: 7.96 K/UL — SIGNIFICANT CHANGE UP (ref 3.8–10.5)
WBC # FLD AUTO: 7.96 K/UL — SIGNIFICANT CHANGE UP (ref 3.8–10.5)

## 2024-10-14 PROCEDURE — 99232 SBSQ HOSP IP/OBS MODERATE 35: CPT

## 2024-10-14 RX ADMIN — Medication 1 TABLET(S): at 06:22

## 2024-10-14 RX ADMIN — GABAPENTIN 100 MILLIGRAM(S): 800 TABLET, FILM COATED ORAL at 21:58

## 2024-10-14 RX ADMIN — LIDOCAINE 1 PATCH: 50 CREAM TOPICAL at 06:22

## 2024-10-14 RX ADMIN — LIDOCAINE 1 PATCH: 50 CREAM TOPICAL at 18:19

## 2024-10-14 RX ADMIN — GABAPENTIN 100 MILLIGRAM(S): 800 TABLET, FILM COATED ORAL at 14:37

## 2024-10-14 RX ADMIN — ENOXAPARIN SODIUM 40 MILLIGRAM(S): 150 INJECTION SUBCUTANEOUS at 10:34

## 2024-10-14 RX ADMIN — Medication 1 TABLET(S): at 14:37

## 2024-10-14 RX ADMIN — SERTRALINE HYDROCHLORIDE 100 MILLIGRAM(S): 100 TABLET, FILM COATED ORAL at 10:35

## 2024-10-14 RX ADMIN — GABAPENTIN 100 MILLIGRAM(S): 800 TABLET, FILM COATED ORAL at 06:22

## 2024-10-14 RX ADMIN — LIDOCAINE 1 PATCH: 50 CREAM TOPICAL at 10:34

## 2024-10-14 RX ADMIN — Medication 1 TABLET(S): at 21:58

## 2024-10-14 RX ADMIN — Medication 1 PACKET(S): at 10:35

## 2024-10-14 NOTE — PROGRESS NOTE ADULT - ASSESSMENT
89 yo F s/p mechanical fall, with Mildly displaced fractures involving the left anterior and superior pubic rami, with extension to the anterior left acetabulum and nondisplaced fractures involving the right superior and inferior pubic rami    B/L pelvic fractures with extension to left acetabulum  S/P trauma/fall stairs    Plan:    Hospitalist for co-mgmt:  -Parkinsons Disease  -  stable, continue Sinemet  -  fall precautions  Depression  -  continue Sertraline    Ortho recs appreciated:  - WBAT  - No acute orthopaedic interventions at this time  - Follow up with Dr. Schmitt in office    PT-  LES, Rolling walker     Wound care consult- LLE chronic venous stasis wound (pending)    Incentive spirometry  Fall risk precautions  Pain control  F/U labs  Cont current care and meds  GI/DVT prophylaxis     D/W Attending

## 2024-10-14 NOTE — PROGRESS NOTE ADULT - SUBJECTIVE AND OBJECTIVE BOX
Chart and meds reviewed.  Patient seen and examined.    10/14 - patient  seen at bedside, daughter/granddaughter present, pt very anxious.  She denies any chest pain, shortness of breath, nausea or vomiting at this time    All other systems reviewed and found to be negative with the exception of what has been described above.    MEDICATIONS  (STANDING):  carbidopa/levodopa  25/100 1 Tablet(s) Oral three times a day  enoxaparin Injectable 40 milliGRAM(s) SubCutaneous every 24 hours  gabapentin 100 milliGRAM(s) Oral three times a day  lidocaine   4% Patch 1 Patch Transdermal every 24 hours  sertraline 100 milliGRAM(s) Oral daily    MEDICATIONS  (PRN):  acetaminophen     Tablet .. 650 milliGRAM(s) Oral every 6 hours PRN Temp greater or equal to 38C (100.4F), Mild Pain (1 - 3)  cyclobenzaprine 5 milliGRAM(s) Oral three times a day PRN Muscle Spasm  ondansetron Injectable 4 milliGRAM(s) IV Push every 6 hours PRN Nausea  oxyCODONE    IR 5 milliGRAM(s) Oral Once PRN Severe Pain (7 - 10)  oxyCODONE    IR 2.5 milliGRAM(s) Oral every 6 hours PRN Moderate Pain (4 - 6)    Vital Signs Last 24 Hrs  T(C): 37.2 (14 Oct 2024 08:24), Max: 37.2 (14 Oct 2024 08:24)  T(F): 99 (14 Oct 2024 08:24), Max: 99 (14 Oct 2024 08:24)  HR: 87 (14 Oct 2024 08:24) (87 - 105)  BP: 104/67 (14 Oct 2024 08:24) (100/47 - 107/53)  BP(mean): 66 (14 Oct 2024 00:55) (66 - 66)  RR: 18 (14 Oct 2024 08:24) (16 - 18)  SpO2: 95% (14 Oct 2024 08:24) (93% - 95%)    Parameters below as of 14 Oct 2024 08:24  Patient On (Oxygen Delivery Method): room air    PHYSICAL EXAM:    GENERAL: Comfortable, no acute distress   HEAD:  Normocephalic, atraumatic  EYES: EOMI, PERRLA  HEENT: Moist mucous membranes  NECK: Supple, No JVD  NERVOUS SYSTEM:  Alert & Oriented X3, Motor Strength 5/5 B/L upper and lower extremities  CHEST/LUNG: Clear to auscultation bilaterally  HEART: Regular rate and rhythm  ABDOMEN: Soft, non tender, Nondistended, Bowel sounds present  GENITOURINARY: Voiding, no palpable bladder  EXTREMITIES:   No clubbing, cyanosis, or edema  MUSCULOSKELETAL- + pain in lower back, Left hip pain  SKIN-no rash      LABS:                                            10.8   7.96  )-----------( 177      ( 14 Oct 2024 09:10 )             33.8       10-14    132[L]  |  103  |  17  ----------------------------<  108[H]  4.3   |  24  |  0.48[L]    Ca    8.4[L]      14 Oct 2024 09:10  Phos  3.0     10-14  Mg     2.1     10-14      89 F with Hx of Parkinsons, Lumbar fusion, and appendectomy was admitted after a mechanical fall walking up the stairs complicated by mildly displaced bilateral pelvic fractures.    Acute Mechanical Fall with Bilateral Pelvic Fractures with Extension to the Left Acetabulum  -  pain control  -  monitor H/H  -  OOB to chair  -  fall precautions  -  PT as tolerated  -  no surgical intervention  -   Vitamin D level pending    Parkinsons Disease  -  stable, continue Sinemet  -  fall precautions    Depression  -  continue Sertraline    Hyponatremia  -  resolved    Hypophosphatemia  -  resolved    DVT prophylaxis  -  venodynes and Lovenox    Severe protein malnutrition  seen By nutrition  recs regular diet with ensure HP x 2 per day    Dispo:  PT is recommended [facility],

## 2024-10-14 NOTE — PROGRESS NOTE ADULT - SUBJECTIVE AND OBJECTIVE BOX
CC:Patient is a 89y old  Female who presents with a chief complaint of s/p mechanical fall (13 Oct 2024 14:01)    Progress note     Subjective:  Pt seen and examined at bedside with chaperone. Pt is AAOx3, pt in no acute distress. Pt denied c/o fever, chills, chest pain, SOB, abd pain, N/V/D, extremity pain or dysfunction, hemoptysis, hematemesis, hematuria, hematochezia headache, diplopia, vertigo, dizziness Pt tolerating diet, (+) void, (+) ambulation, (+) bowel function    No acute events overnight     ROS:  otherwise as abovementioned ROS    Vital Signs Last 24 Hrs  T(C): 37.2 (14 Oct 2024 08:24), Max: 37.2 (14 Oct 2024 08:24)  T(F): 99 (14 Oct 2024 08:24), Max: 99 (14 Oct 2024 08:24)  HR: 87 (14 Oct 2024 08:24) (87 - 105)  BP: 104/67 (14 Oct 2024 08:24) (100/47 - 107/53)  BP(mean): 66 (14 Oct 2024 00:55) (66 - 66)  RR: 18 (14 Oct 2024 08:24) (16 - 18)  SpO2: 95% (14 Oct 2024 08:24) (93% - 95%)    Parameters below as of 14 Oct 2024 08:24  Patient On (Oxygen Delivery Method): room air        Labs:                                10.8   7.96  )-----------( 177      ( 14 Oct 2024 09:10 )             33.8     CBC Full  -  ( 14 Oct 2024 09:10 )  WBC Count : 7.96 K/uL  RBC Count : 3.94 M/uL  Hemoglobin : 10.8 g/dL  Hematocrit : 33.8 %  Platelet Count - Automated : 177 K/uL  Mean Cell Volume : 85.8 fl  Mean Cell Hemoglobin : 27.4 pg  Mean Cell Hemoglobin Concentration : 32.0 gm/dL  Auto Neutrophil # : 6.14 K/uL  Auto Lymphocyte # : 1.01 K/uL  Auto Monocyte # : 0.56 K/uL  Auto Eosinophil # : 0.15 K/uL  Auto Basophil # : 0.05 K/uL  Auto Neutrophil % : 77.2 %  Auto Lymphocyte % : 12.7 %  Auto Monocyte % : 7.0 %  Auto Eosinophil % : 1.9 %  Auto Basophil % : 0.6 %    10-14    132[L]  |  103  |  17  ----------------------------<  108[H]  4.3   |  24  |  0.48[L]    Ca    8.4[L]      14 Oct 2024 09:10  Phos  3.0     10-14  Mg     2.1     10-14              Meds:  acetaminophen     Tablet .. 650 milliGRAM(s) Oral every 6 hours PRN  carbidopa/levodopa  25/100 1 Tablet(s) Oral three times a day  cyclobenzaprine 5 milliGRAM(s) Oral three times a day PRN  enoxaparin Injectable 40 milliGRAM(s) SubCutaneous every 24 hours  gabapentin 100 milliGRAM(s) Oral three times a day  lidocaine   4% Patch 1 Patch Transdermal every 24 hours  ondansetron Injectable 4 milliGRAM(s) IV Push every 6 hours PRN  oxyCODONE    IR 5 milliGRAM(s) Oral Once PRN  oxyCODONE    IR 2.5 milliGRAM(s) Oral every 6 hours PRN  sertraline 100 milliGRAM(s) Oral daily      Radiology:    10/11/2024  AP pelvis and 2 views of left femur.    Clinical indications: Left leg pain.    IMPRESSION: Moderate to severe right and moderate left hip   osteoarthrosis. Lower lumbar degenerative disc disease. 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    Displaced fractures of the left superior and inferior pubic rami    No femoral neck fracture.      Physical exam:  GCS of 15  Pt is aaox3  Pt in no acute distress  Airway is patent  Breathing is symmetric and unlabored  Neuro: CN II-XII grossly intact  Psych: normal affect  HEENT: normocephalic, SAM, EOM wnl, no gross craniofacial bony pathology to exam  HEAD: NECK: Normocephalic, occiput laceration repaired  clean dry and intact ,neck supple,  Chest: No gross rib or sternal pathology or tenderness to exam, no crepitus, no ecchymosis, no hematoma  Resp: CTAB  CVS: S1S2(+)  ABD: bowel sounds (+), soft, nontender, non distended, no rebound, no guarding, no rigidity, no pelvic instability to exam  EXT: no calf tenderness or edema to exam b/l, pt has good capillary refill in all digits. Sensoromotor function grossly intact, on VTE prophylaxis  MSK: Normal ROM without pain, no spinal tenderness, normal muscle strength/tone, pain at the hip, no pelvic instability, known b/l plvic fractures  SKIN: No rashes or ulcers noted; no subcutaneous nodules or induration palpable, LLE chronic venous stasis wound noted. Wound dressing in place, clean and dry        CC: Patient is a 89y old  Female who presents with a chief complaint of s/p mechanical fall (13 Oct 2024 14:01)    Progress note     Subjective:  Pt seen and examined at bedside with chaperone. Pt is AAOx3, pt in no acute distress. Pt denied c/o fever, chills, chest pain, SOB, abd pain, N/V/D, extremity pain or dysfunction, hemoptysis, hematemesis, hematuria, hematochezia headache, diplopia, vertigo, dizziness Pt tolerating diet, (+) void, (+) ambulation, (+) bowel function    No acute events overnight     ROS:  otherwise as abovementioned ROS    Vital Signs Last 24 Hrs  T(C): 37.2 (14 Oct 2024 08:24), Max: 37.2 (14 Oct 2024 08:24)  T(F): 99 (14 Oct 2024 08:24), Max: 99 (14 Oct 2024 08:24)  HR: 87 (14 Oct 2024 08:24) (87 - 105)  BP: 104/67 (14 Oct 2024 08:24) (100/47 - 107/53)  BP(mean): 66 (14 Oct 2024 00:55) (66 - 66)  RR: 18 (14 Oct 2024 08:24) (16 - 18)  SpO2: 95% (14 Oct 2024 08:24) (93% - 95%)    Parameters below as of 14 Oct 2024 08:24  Patient On (Oxygen Delivery Method): room air        Labs:                                10.8   7.96  )-----------( 177      ( 14 Oct 2024 09:10 )             33.8     CBC Full  -  ( 14 Oct 2024 09:10 )  WBC Count : 7.96 K/uL  RBC Count : 3.94 M/uL  Hemoglobin : 10.8 g/dL  Hematocrit : 33.8 %  Platelet Count - Automated : 177 K/uL  Mean Cell Volume : 85.8 fl  Mean Cell Hemoglobin : 27.4 pg  Mean Cell Hemoglobin Concentration : 32.0 gm/dL  Auto Neutrophil # : 6.14 K/uL  Auto Lymphocyte # : 1.01 K/uL  Auto Monocyte # : 0.56 K/uL  Auto Eosinophil # : 0.15 K/uL  Auto Basophil # : 0.05 K/uL  Auto Neutrophil % : 77.2 %  Auto Lymphocyte % : 12.7 %  Auto Monocyte % : 7.0 %  Auto Eosinophil % : 1.9 %  Auto Basophil % : 0.6 %    10-14    132[L]  |  103  |  17  ----------------------------<  108[H]  4.3   |  24  |  0.48[L]    Ca    8.4[L]      14 Oct 2024 09:10  Phos  3.0     10-14  Mg     2.1     10-14              Meds:  acetaminophen     Tablet .. 650 milliGRAM(s) Oral every 6 hours PRN  carbidopa/levodopa  25/100 1 Tablet(s) Oral three times a day  cyclobenzaprine 5 milliGRAM(s) Oral three times a day PRN  enoxaparin Injectable 40 milliGRAM(s) SubCutaneous every 24 hours  gabapentin 100 milliGRAM(s) Oral three times a day  lidocaine   4% Patch 1 Patch Transdermal every 24 hours  ondansetron Injectable 4 milliGRAM(s) IV Push every 6 hours PRN  oxyCODONE    IR 5 milliGRAM(s) Oral Once PRN  oxyCODONE    IR 2.5 milliGRAM(s) Oral every 6 hours PRN  sertraline 100 milliGRAM(s) Oral daily      Radiology:    10/11/2024  AP pelvis and 2 views of left femur.    Clinical indications: Left leg pain.    IMPRESSION: Moderate to severe right and moderate left hip   osteoarthrosis. Lower lumbar degenerative disc disease. 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    Displaced fractures of the left superior and inferior pubic rami    No femoral neck fracture.      Physical exam:  GCS of 15  Pt is aaox3  Pt in no acute distress  Airway is patent  Breathing is symmetric and unlabored  Neuro: CN II-XII grossly intact  Psych: normal affect  HEENT: normocephalic, SAM, EOM wnl, no gross craniofacial bony pathology to exam  HEAD: NECK: Normocephalic, occiput laceration repaired  clean dry and intact ,neck supple,  Chest: No gross rib or sternal pathology or tenderness to exam, no crepitus, no ecchymosis, no hematoma  Resp: CTAB  CVS: S1S2(+)  ABD: bowel sounds (+), soft, nontender, non distended, no rebound, no guarding, no rigidity, no pelvic instability to exam  EXT: no calf tenderness or edema to exam b/l, pt has good capillary refill in all digits. Sensoromotor function grossly intact, on VTE prophylaxis  MSK: Normal ROM without pain, no spinal tenderness, normal muscle strength/tone, pain at the hip, no pelvic instability, known b/l plvic fractures  SKIN: No rashes or ulcers noted; no subcutaneous nodules or induration palpable, LLE chronic venous stasis wound noted. Wound dressing in place, clean and dry

## 2024-10-15 ENCOUNTER — TRANSCRIPTION ENCOUNTER (OUTPATIENT)
Age: 89
End: 2024-10-15

## 2024-10-15 VITALS
RESPIRATION RATE: 18 BRPM | SYSTOLIC BLOOD PRESSURE: 116 MMHG | HEART RATE: 74 BPM | TEMPERATURE: 97 F | OXYGEN SATURATION: 94 % | DIASTOLIC BLOOD PRESSURE: 56 MMHG

## 2024-10-15 LAB
ANION GAP SERPL CALC-SCNC: 5 MMOL/L — SIGNIFICANT CHANGE UP (ref 5–17)
BUN SERPL-MCNC: 18 MG/DL — SIGNIFICANT CHANGE UP (ref 7–23)
CALCIUM SERPL-MCNC: 8.4 MG/DL — LOW (ref 8.5–10.1)
CHLORIDE SERPL-SCNC: 103 MMOL/L — SIGNIFICANT CHANGE UP (ref 96–108)
CO2 SERPL-SCNC: 25 MMOL/L — SIGNIFICANT CHANGE UP (ref 22–31)
CREAT SERPL-MCNC: 0.42 MG/DL — LOW (ref 0.5–1.3)
EGFR: 92 ML/MIN/1.73M2 — SIGNIFICANT CHANGE UP
GLUCOSE SERPL-MCNC: 99 MG/DL — SIGNIFICANT CHANGE UP (ref 70–99)
HCT VFR BLD CALC: 33.2 % — LOW (ref 34.5–45)
HGB BLD-MCNC: 10.6 G/DL — LOW (ref 11.5–15.5)
MAGNESIUM SERPL-MCNC: 2 MG/DL — SIGNIFICANT CHANGE UP (ref 1.6–2.6)
MCHC RBC-ENTMCNC: 27.3 PG — SIGNIFICANT CHANGE UP (ref 27–34)
MCHC RBC-ENTMCNC: 31.9 GM/DL — LOW (ref 32–36)
MCV RBC AUTO: 85.6 FL — SIGNIFICANT CHANGE UP (ref 80–100)
PHOSPHATE SERPL-MCNC: 2.8 MG/DL — SIGNIFICANT CHANGE UP (ref 2.5–4.5)
PLATELET # BLD AUTO: 169 K/UL — SIGNIFICANT CHANGE UP (ref 150–400)
POTASSIUM SERPL-MCNC: 4.1 MMOL/L — SIGNIFICANT CHANGE UP (ref 3.5–5.3)
POTASSIUM SERPL-SCNC: 4.1 MMOL/L — SIGNIFICANT CHANGE UP (ref 3.5–5.3)
RBC # BLD: 3.88 M/UL — SIGNIFICANT CHANGE UP (ref 3.8–5.2)
RBC # FLD: 14.2 % — SIGNIFICANT CHANGE UP (ref 10.3–14.5)
SODIUM SERPL-SCNC: 133 MMOL/L — LOW (ref 135–145)
WBC # BLD: 7.29 K/UL — SIGNIFICANT CHANGE UP (ref 3.8–10.5)
WBC # FLD AUTO: 7.29 K/UL — SIGNIFICANT CHANGE UP (ref 3.8–10.5)

## 2024-10-15 PROCEDURE — 99232 SBSQ HOSP IP/OBS MODERATE 35: CPT

## 2024-10-15 RX ORDER — ERGOCALCIFEROL 1.25 MG/1
50000 CAPSULE ORAL
Refills: 0 | Status: DISCONTINUED | OUTPATIENT
Start: 2024-10-15 | End: 2024-10-15

## 2024-10-15 RX ORDER — ACETAMINOPHEN 325 MG
2 TABLET ORAL
Qty: 0 | Refills: 0 | DISCHARGE
Start: 2024-10-15

## 2024-10-15 RX ORDER — OXYCODONE HYDROCHLORIDE 30 MG/1
2.5 TABLET, FILM COATED, EXTENDED RELEASE ORAL EVERY 4 HOURS
Refills: 0 | Status: DISCONTINUED | OUTPATIENT
Start: 2024-10-15 | End: 2024-10-15

## 2024-10-15 RX ORDER — SENNOSIDES 8.6 MG
2 TABLET ORAL
Qty: 0 | Refills: 0 | DISCHARGE
Start: 2024-10-15

## 2024-10-15 RX ORDER — GABAPENTIN 800 MG/1
1 TABLET, FILM COATED ORAL
Qty: 0 | Refills: 0 | DISCHARGE
Start: 2024-10-15

## 2024-10-15 RX ORDER — OXYCODONE HYDROCHLORIDE 30 MG/1
0.5 TABLET, FILM COATED, EXTENDED RELEASE ORAL
Qty: 0 | Refills: 0 | DISCHARGE
Start: 2024-10-15

## 2024-10-15 RX ORDER — TRAMADOL HYDROCHLORIDE 50 MG/1
0.5 TABLET, COATED ORAL
Qty: 0 | Refills: 0 | DISCHARGE

## 2024-10-15 RX ORDER — ACETAMINOPHEN 325 MG
1000 TABLET ORAL EVERY 8 HOURS
Refills: 0 | Status: DISCONTINUED | OUTPATIENT
Start: 2024-10-15 | End: 2024-10-15

## 2024-10-15 RX ORDER — CYCLOBENZAPRINE HCL 10 MG
1 TABLET ORAL
Qty: 0 | Refills: 0 | DISCHARGE
Start: 2024-10-15

## 2024-10-15 RX ORDER — SENNOSIDES 8.6 MG
2 TABLET ORAL AT BEDTIME
Refills: 0 | Status: DISCONTINUED | OUTPATIENT
Start: 2024-10-15 | End: 2024-10-15

## 2024-10-15 RX ORDER — TRAMADOL HYDROCHLORIDE 50 MG/1
25 TABLET, COATED ORAL EVERY 4 HOURS
Refills: 0 | Status: DISCONTINUED | OUTPATIENT
Start: 2024-10-15 | End: 2024-10-15

## 2024-10-15 RX ORDER — LIDOCAINE 50 MG/G
0 CREAM TOPICAL
Qty: 0 | Refills: 0 | DISCHARGE
Start: 2024-10-15

## 2024-10-15 RX ORDER — ACETAMINOPHEN 325 MG
2 TABLET ORAL
Qty: 0 | Refills: 0 | DISCHARGE

## 2024-10-15 RX ORDER — ERGOCALCIFEROL 1.25 MG/1
1 CAPSULE ORAL
Qty: 0 | Refills: 0 | DISCHARGE
Start: 2024-10-15

## 2024-10-15 RX ADMIN — LIDOCAINE 1 PATCH: 50 CREAM TOPICAL at 06:52

## 2024-10-15 RX ADMIN — GABAPENTIN 100 MILLIGRAM(S): 800 TABLET, FILM COATED ORAL at 06:11

## 2024-10-15 RX ADMIN — Medication 1 TABLET(S): at 13:20

## 2024-10-15 RX ADMIN — Medication 1000 MILLIGRAM(S): at 13:20

## 2024-10-15 RX ADMIN — GABAPENTIN 100 MILLIGRAM(S): 800 TABLET, FILM COATED ORAL at 13:20

## 2024-10-15 RX ADMIN — Medication 1 APPLICATION(S): at 12:06

## 2024-10-15 RX ADMIN — ENOXAPARIN SODIUM 40 MILLIGRAM(S): 150 INJECTION SUBCUTANEOUS at 10:18

## 2024-10-15 RX ADMIN — LIDOCAINE 1 PATCH: 50 CREAM TOPICAL at 06:12

## 2024-10-15 RX ADMIN — Medication 1 TABLET(S): at 06:12

## 2024-10-15 RX ADMIN — SERTRALINE HYDROCHLORIDE 100 MILLIGRAM(S): 100 TABLET, FILM COATED ORAL at 10:17

## 2024-10-15 RX ADMIN — ERGOCALCIFEROL 50000 UNIT(S): 1.25 CAPSULE ORAL at 12:05

## 2024-10-15 NOTE — DISCHARGE NOTE PROVIDER - NSDCACTIVITY_GEN_ALL_CORE
Walking - Indoors allowed/No heavy lifting/straining/Follow Instructions Provided by your Surgical Team/Activity as tolerated

## 2024-10-15 NOTE — DISCHARGE NOTE NURSING/CASE MANAGEMENT/SOCIAL WORK - PATIENT PORTAL LINK FT
You can access the FollowMyHealth Patient Portal offered by [facility] by registering at the following website: [email or web address]. By joining [facility]’s FollowMyHealth portal, you will also be able to view your health information using other applications (apps) compatible with our system.

## 2024-10-15 NOTE — DISCHARGE NOTE NURSING/CASE MANAGEMENT/SOCIAL WORK - NSDCFUADDAPPT_GEN_ALL_CORE_FT
Please follow up with Orthopedic office to schedule follow up appointment.   Please follow up with Wound Care [facility] at [facility]  office to schedule follow up appointment.  Wound Care Rec: Daily  -Apply Santyl to whole wound bed   -Cover with Saline Gauze   -Dry Sterile dressing   -Wrap with olivia   -Change prn if dressing compromised     Please follow up with PCP office to schedule follow up appointment.   -Parietal head staples to be removed 10/17/24 (5 staples in place)

## 2024-10-15 NOTE — ADVANCED PRACTICE NURSE CONSULT - ASSESSMENT
This is a 89 year old Female and per MD H&P " w PMHx of Parkinsons, Lumbar fusion, and appendectomy coming in to ED after a mechanical fall walking up the stairs. The patient states she fell backwards when she lost balance and hit her head. Denies LOC. Daughter at bedside corroborated the story. Was previously on AC, but has since discontinued it for years. Denies chest pain, SOB, fevers or chills. No external signs of trauma. Pan scan negative, after calling radiology and reviewal of CT scan, bilateral pubic rami frx are noted."    Assessed patient on [address] [place] [address]   Patient alert to person not time, situation or place.     Arun 17  Per NA and RN Patient required netta stedy to get to bathroom. Patient is continent of urine and stool and able to make those needs known.     Left Lower Extremity Lateral Calf with partial thickness wound 2.5cm x 3.5cm x 0.2cm with 60 % of red viable tissue and 40% of white yellow slough with scabbing along the periwound edge 12-12 o'c. When cleaning the wound with saline patient did not report any pain. Recommend Cleaning with saline or wound cleanser and pat dry, apply Vince thick or 2mm amount of collagenase to whole wound bed and cover with saline gauze and dry sterile dressing and wrap with olivia and can be changed daily and prn if dressing is compromised.   If patient and family would like follow-up at [facility] for follow-up wound care outpatient

## 2024-10-15 NOTE — DISCHARGE NOTE PROVIDER - NSDCMRMEDTOKEN_GEN_ALL_CORE_FT
acetaminophen 325 mg oral tablet: 2 tab(s) orally every 6 hours As needed Temp greater or equal to 38C (100.4F), Mild Pain (1 - 3)  cyclobenzaprine 5 mg oral tablet: 1 tab(s) orally 3 times a day As needed Muscle Spasm  gabapentin 100 mg oral capsule: 1 cap(s) orally 3 times a day  lidocaine 4% topical film: Apply topically to affected area once a day  oxyCODONE 5 mg oral tablet: 1 tab(s) orally once As needed Severe Pain (7 - 10)  polyethylene glycol 3350 oral powder for reconstitution: 17 gram(s) orally once a day As needed Constipation  senna leaf extract oral tablet: 2 tab(s) orally once a day (at bedtime)  sertraline 100 mg oral tablet: 1.5 tab(s) orally once a day  Sinemet 25 mg-100 mg oral tablet: 1.5 tab(s) orally 3 times a day   acetaminophen 325 mg oral tablet: 2 tab(s) orally every 6 hours As needed Temp greater or equal to 38C (100.4F), Mild Pain (1 - 3)  collagenase 250 units/g topical ointment: 1 Apply topically to affected area once a day  cyclobenzaprine 5 mg oral tablet: 1 tab(s) orally 3 times a day As needed Muscle Spasm  gabapentin 100 mg oral capsule: 1 cap(s) orally 3 times a day  lidocaine 4% topical film: Apply topically to affected area once a day  oxyCODONE 5 mg oral tablet: 1 tab(s) orally once As needed Severe Pain (7 - 10)  polyethylene glycol 3350 oral powder for reconstitution: 17 gram(s) orally once a day As needed Constipation  senna leaf extract oral tablet: 2 tab(s) orally once a day (at bedtime)  sertraline 100 mg oral tablet: 1.5 tab(s) orally once a day  Sinemet 25 mg-100 mg oral tablet: 1.5 tab(s) orally 3 times a day   acetaminophen 500 mg oral capsule: 2 cap(s) orally every 8 hours  collagenase 250 units/g topical ointment: 1 Apply topically to affected area once a day  cyclobenzaprine 5 mg oral tablet: 1 tab(s) orally 3 times a day As needed Muscle Spasm  gabapentin 100 mg oral capsule: 1 cap(s) orally 3 times a day  lidocaine 4% topical film: Apply topically to affected area once a day  Lovenox 40 mg/0.4 mL injectable solution: 40 milligram(s) subcutaneously once a day  oxyCODONE 5 mg oral tablet: 0.5 tab(s) orally every 4 hours as needed for Severe Pain (7 - 10)  polyethylene glycol 3350 oral powder for reconstitution: 17 gram(s) orally once a day As needed Constipation  senna leaf extract oral tablet: 2 tab(s) orally once a day (at bedtime)  sertraline 100 mg oral tablet: 1.5 tab(s) orally once a day  Sinemet 25 mg-100 mg oral tablet: 1.5 tab(s) orally 3 times a day  traMADol 50 mg oral tablet: 0.5 tab(s) orally every 4 hours as needed for  moderate pain   acetaminophen 500 mg oral capsule: 2 cap(s) orally every 8 hours  collagenase 250 units/g topical ointment: 1 Apply topically to affected area once a day  cyclobenzaprine 5 mg oral tablet: 1 tab(s) orally 3 times a day As needed Muscle Spasm  Drisdol 1.25 mg (50,000 intl units) oral capsule: 1 cap(s) orally once a week on tuesdays  gabapentin 100 mg oral capsule: 1 cap(s) orally 3 times a day  lidocaine 4% topical film: Apply topically to affected area once a day  Lovenox 40 mg/0.4 mL injectable solution: 40 milligram(s) subcutaneously once a day  oxyCODONE 5 mg oral tablet: 0.5 tab(s) orally every 4 hours as needed for Severe Pain (7 - 10)  polyethylene glycol 3350 oral powder for reconstitution: 17 gram(s) orally once a day As needed Constipation  senna leaf extract oral tablet: 2 tab(s) orally once a day (at bedtime)  sertraline 100 mg oral tablet: 1.5 tab(s) orally once a day  Sinemet 25 mg-100 mg oral tablet: 1.5 tab(s) orally 3 times a day  traMADol 50 mg oral tablet: 0.5 tab(s) orally every 4 hours as needed for  moderate pain

## 2024-10-15 NOTE — ADVANCED PRACTICE NURSE CONSULT - RECOMMEDATIONS
-Continue turning/positioning patient from side-to-side q2h while in bed, q1h when/if OOB chair, or in accordance w/ pt's plan of care. Utilize pillows and/or Spry positioner pillow to assist w/ turning/positioning. When/if OOB chair, utilize pillows or chair cushion to offload pressure.   -Continue to offload heels from bed surface with soft pillow under calfs or by applying offloading boots to BLEs.   -Continue utilizing one underpad underneath patient   -Continue nutrition consult for optimal wound healing & nutritional status.   -Assess skin/wound qshift, report changes to primary provider.   -Follow-up at [facility] [phone] for follow-up outpatient wound care     Left LAteral Calf Wound:   -Clean with saline and pat dry   -Apply Collagenase for enzymatic debridement to whole wound bed (Vince thick amount or 2mm thick)   -Cover with Saline gauze   -Dry Sterile dressing and secure with Luis Manuel   Change daily and prn if dressing is compromised.       Plan of Care: Primary RN made aware of above recs. Spoke w/ TOMY Cope  in regards to above. No further needs/recs from [facility] service at this time. Staff RN to perform routine skin/wound assessment and manage wound care. Questions or concerns or if wound worsens and reconsult needed, please contact [facility]

## 2024-10-15 NOTE — DISCHARGE NOTE PROVIDER - DETAILS OF MALNUTRITION DIAGNOSIS/DIAGNOSES
This patient has been assessed with a concern for Malnutrition and was treated during this hospitalization for the following Nutrition diagnosis/diagnoses:     -  10/12/2024: Severe protein-calorie malnutrition

## 2024-10-15 NOTE — DISCHARGE NOTE PROVIDER - NSDCCPCAREPLAN_GEN_ALL_CORE_FT
Patient states that on 02/29/16 OV, Dr Bernardo Mcdaniels gave patient a sample of a nasal spray states has since ran out and forgot the name of it. Would like to know name of medication. Please call.  Thank you PRINCIPAL DISCHARGE DIAGNOSIS  Diagnosis: Fracture of multiple pubic rami  Assessment and Plan of Treatment:

## 2024-10-15 NOTE — PROGRESS NOTE ADULT - NS ATTEND AMEND GEN_ALL_CORE FT
I have seen and examined the patient  I agree with the assessment and plans as follows    A 89 year old F s/p mechanical fall, with Mildly displaced fractures involving the left anterior and superior pubic rami, with extension to the anterior left acetabulum and nondisplaced fractures involving the right superior and inferior pubic rami    Pain only with movement  Tolerating diet, no nausea or vomiting    On exam:    GCS of 15  Pt is aaox3  Pt in no acute distress  Airway is patent  Breathing is symmetric and unlabored  Neuro: CN II-XII grossly intact  Psych: normal affect  HEENT: normocephalic, SAM, EOM wnl, no gross craniofacial bony pathology to exam  HEAD: NECK: Normocephalic, occiput laceration repaired  clean dry and intact ,neck supple,  Chest: No gross rib or sternal pathology or tenderness to exam, no crepitus, no ecchymosis, no hematoma  Resp: CTAB  CVS: S1S2(+)  ABD: bowel sounds (+), soft, nontender, non distended, no rebound, no guarding, no rigidity, no pelvic instability to exam  EXT: no calf tenderness or edema to exam b/l, pt has good capillary refill in all digits. Sensoromotor function grossly intact, on VTE prophylaxis  MSK: Normal ROM without pain, no spinal tenderness, normal muscle strength/tone, pain at the hip, no pelvic instability, known b/l plvic fractures  SKIN: No rashes or ulcers noted; no subcutaneous nodules or induration palpable, LLE chronic venous stasis wound noted. Wound dressing in place, clean and dry     Plan:  Hospitalist for co-mgmt:  -Parkinsons Disease  -  stable, continue Sinemet  -  fall precautions  Depression  -  continue Sertraline    Ortho recs appreciated:  - WBAT  - No acute orthopaedic interventions at this time  - Follow up with Dr. Schmitt in office    PT-  LES, Rolling walker     Wound care consult- LLE chronic venous stasis wound    Incentive spirometry  Fall risk precautions  Pain control  F/U labs  Cont current care and meds  DVT prophylaxis    for placement when medically ready in 1-2 days
89 yo F s/p mechanical fall, with Mildly displaced fractures involving the left anterior and superior pubic rami, with extension to the anterior left acetabulum and nondisplaced fractures involving the right superior and inferior pubic rami.    Injuries  B/L pelvic fractures with extension to left acetabulum  S/P trauma/fall stairs  Pain is better controlled    On exam::  GCS of 15  Pt is aaox3  Pt in no acute distress  Airway is patent  Breathing is symmetric and unlabored  Neuro: CN II-XII grossly intact  Psych: normal affect  HEENT: normocephalic, SAM, EOM wnl, no gross craniofacial bony pathology to exam  HEAD: NECK: Normocephalic, occiput laceration repaired with staples in place, clean dry and intact ,neck supple,  Chest: No gross rib or sternal pathology or tenderness to exam, no crepitus, no ecchymosis, no hematoma  Resp: CTAB  CVS: S1S2(+)  ABD: bowel sounds (+), soft, nontender, non distended, no rebound, no guarding, no rigidity, no pelvic instability to exam  EXT: no calf tenderness or edema to exam b/l, pt has good capillary refill in all digits. Sensoromotor function grossly intact, on VTE prophylaxis  MSK: Normal ROM without pain, no spinal tenderness, normal muscle strength/tone, pain at the hip, no pelvic instability, known b/l pelvic fractures  SKIN: No rashes or ulcers noted; no subcutaneous nodules or induration palpable, LLE chronic wound noted. Wound dressing in place, clean and dry         Plan:    Hospitalist for co-mgmt:  Parkinson's Disease  -  stable, continue Sinemet  -  fall precautions  Depression  -  continue Sertraline    Ortho recs appreciated:  - WBAT  - No acute orthopaedic interventions at this time  - Follow up with Dr. Schmitt in office    PT-  LES, Rolling walker     Wound care consult- LLE chronic wound     Incentive spirometry  Fall risk precautions  Pain control  F/U labs  Cont current care and meds  DVT prophylaxis

## 2024-10-15 NOTE — PROGRESS NOTE ADULT - SUBJECTIVE AND OBJECTIVE BOX
Chart and meds reviewed.  Patient seen and examined.    10/15 - patient seen OOB to chair, awake, but states she " feels confused."   She denies any chest pain, shortness of breath, nausea or vomiting at this time. jennifer po, without any N/V, denies any CP or sob    All other systems reviewed and found to be negative with the exception of what has been described above.    MEDICATIONS  (STANDING):  acetaminophen     Tablet .. 1000 milliGRAM(s) Oral every 8 hours  carbidopa/levodopa  25/100 1 Tablet(s) Oral three times a day  collagenase Ointment 1 Application(s) Topical daily  enoxaparin Injectable 40 milliGRAM(s) SubCutaneous every 24 hours  gabapentin 100 milliGRAM(s) Oral three times a day  lidocaine   4% Patch 1 Patch Transdermal every 24 hours  senna 2 Tablet(s) Oral at bedtime  sertraline 100 milliGRAM(s) Oral daily    MEDICATIONS  (PRN):  cyclobenzaprine 5 milliGRAM(s) Oral three times a day PRN Muscle Spasm  ondansetron Injectable 4 milliGRAM(s) IV Push every 6 hours PRN Nausea  oxyCODONE    IR 2.5 milliGRAM(s) Oral every 4 hours PRN Severe Pain (7 - 10)  polyethylene glycol 3350 17 Gram(s) Oral daily PRN Constipation  traMADol 25 milliGRAM(s) Oral every 4 hours PRN Moderate Pain (4 - 6)    Vital Signs Last 24 Hrs  T(C): 36.3 (15 Oct 2024 07:36), Max: 37 (15 Oct 2024 00:25)  T(F): 97.3 (15 Oct 2024 07:36), Max: 98.6 (15 Oct 2024 00:25)  HR: 74 (15 Oct 2024 07:36) (74 - 89)  BP: 116/56 (15 Oct 2024 07:36) (108/60 - 116/56)  BP(mean): 68 (14 Oct 2024 16:00) (68 - 68)  RR: 18 (15 Oct 2024 07:36) (18 - 18)  SpO2: 94% (15 Oct 2024 07:36) (93% - 94%)    Parameters below as of 15 Oct 2024 07:36  Patient On (Oxygen Delivery Method): room air      PHYSICAL EXAM:    GENERAL: Comfortable, no acute distress   HEAD:  Normocephalic, atraumatic  EYES: EOMI, PERRLA  HEENT: Moist mucous membranes  NECK: Supple, No JVD  NERVOUS SYSTEM:  Alert & Oriented X3, Motor Strength 5/5 B/L upper and lower extremities  CHEST/LUNG: Clear to auscultation bilaterally  HEART: Regular rate and rhythm  ABDOMEN: Soft, non tender, Nondistended, Bowel sounds present  GENITOURINARY: Voiding, no palpable bladder  EXTREMITIES:   No clubbing, cyanosis, or edema  MUSCULOSKELETAL- + pain in lower back, Left hip pain  SKIN-no rash      LABS:                                                         10.6   7.29  )-----------( 169      ( 15 Oct 2024 06:10 )             33.2       10-15    133[L]  |  103  |  18  ----------------------------<  99  4.1   |  25  |  0.42[L]    Ca    8.4[L]      15 Oct 2024 06:10  Phos  2.8     10-15  Mg     2.0     10-15      89 F with Hx of Parkinsons, Lumbar fusion, and appendectomy was admitted after a mechanical fall walking up the stairs complicated by mildly displaced bilateral pelvic fractures.    Acute Mechanical Fall with Bilateral Pelvic Fractures with Extension to the Left Acetabulum  -  pain control with tylenol/tramadol/ Oxy 2.5 mg  -  monitor H/H  -  OOB to chair  -  fall precautions  -  PT as tolerated  -  no surgical intervention  -   Vitamin D level pending    Parkinsons Disease  -  stable, continue Sinemet  -  fall precautions    Depression  -  continue Sertraline    Hyponatremia  -  resolved    Hypophosphatemia  -  resolved    DVT prophylaxis  -  venodynes and Lovenox    # Vitamin D def  serum level 17  with start on ergocalciferol 50,000 units po q week    Severe protein malnutrition  seen By nutrition  recs regular diet with ensure HP x 2 per day    Dispo:  rehab today

## 2024-10-15 NOTE — PROGRESS NOTE ADULT - TIME BILLING
Pt examination, review of relevant labs and radiologic studies, assured stabilization of pt, discussion with relevant services/providers for coordination of pt care and services, time is exclusive of resident and/or physician assistant teaching or supervision time
Pt examination, review of relevant labs and radiologic studies, assured stabilization of pt, discussion with relevant services/providers for coordination of pt care and services, time is exclusive of resident and/or physician assistant teaching or supervision time

## 2024-10-15 NOTE — DISCHARGE NOTE PROVIDER - INSTRUCTIONS
Supplement Feeding Modality:  Oral  Ensure Plus High Protein Cans or Servings Per Day:  1       Frequency:  Two Times a day (10-12-24 @ 11:23) [Active]

## 2024-10-15 NOTE — DISCHARGE NOTE PROVIDER - HOSPITAL COURSE
89 yo F s/p mechanical fall, with Mildly displaced fractures involving the left anterior and superior pubic rami, with extension to the anterior left acetabulum and nondisplaced fractures involving the right superior and inferior pubic rami.    Injuries  B/L pelvic fractures with extension to left acetabulum  S/P trauma/fall stairs    Hospital course: pt admitted to trauma service    Hospitalist co-mgmt appreciated:   Parkinson's Disease  -  stable, continue Sinemet  -  fall precautions  Depression  -  continue Sertraline    Ortho recs appreciated:  - WBAT  - No acute orthopaedic interventions at this time  - Follow up with Dr. Schmitt in office    PT-  LES, Rolling walker     Wound care consult- LLE chronic wound *_____________*    Pt seen on morning rounds.  Case and plan vilma attending, pt cleared for dc to LES. 89y/o F w PMHx of Parkinson's, Lumbar fusion, and appendectomy presents to  ED on 10/11/24 s/p mechanical fall walking up the stairs. Noted to have a posterior parietal laceration with primary in ED (5-staples placed). Diagnostic imaging demonstrating mildly displaced fractures involving the left anterior and superior pubic rami, with extension to the anterior left acetabulum and nondisplaced fractures involving the right superior and inferior pubic rami. Orthopedic surgery consulted: No acute surgical intervention. Hospital course remained uneventful, chronic LLE wound assessed by Wound Care team (collagenase topical daily). Pt currently in NAD and without acute complaints. Denies N/V/D HA, dizziness, blurry vision, numbness/tingling, SOB, cough, chest pain, palpitations, abd pain or urinary sx's. Pt seen and evaluated by surgical attending, deemed clear for d/c to LES today.                           10.6   7.29  )-----------( 169      ( 15 Oct 2024 06:10 )             33.2     10-15    133[L]  |  103  |  18  ----------------------------<  99  4.1   |  25  |  0.42[L]    Ca    8.4[L]      15 Oct 2024 06:10  Phos  2.8     10-15  Mg     2.0     10-15

## 2024-10-15 NOTE — DISCHARGE NOTE PROVIDER - NSDCFUADDAPPT_GEN_ALL_CORE_FT
Please call orthopedics for a follow up appt  Please call your PCP for a follow up appt   Please call orthopedics for a follow up appt  Please call your PCP for a follow up appt  -Parietal head staples to be removed 10/17/24 (5 staples in place)   Please follow up with Orthopedic office to schedule follow up appointment.   Please follow up with [facility]  office to schedule follow up appointment.  Wound Care Rec: Daily  -Apply Santyl to whole wound bed   -Cover with Saline Gauze   -Dry Sterile dressing   -Wrap with olivia   -Change prn if dressing compromised     Please follow up with PCP office to schedule follow up appointment.   -Parietal head staples to be removed 10/17/24 (5 staples in place)

## 2024-10-15 NOTE — DISCHARGE NOTE PROVIDER - CARE PROVIDER_API CALL
Luis Schmitt Burchard  Orthopaedic Surgery  [address] [place] [address], [facility] [place] [address]  Phone: [phone]  Fax: [phone]  Follow Up Time:

## 2024-10-15 NOTE — PROGRESS NOTE ADULT - NUTRITIONAL ASSESSMENT
This patient has been assessed with a concern for Malnutrition and has been determined to have a diagnosis/diagnoses of Severe protein-calorie malnutrition.    This patient is being managed with:   Diet Regular-  Supplement Feeding Modality:  Oral  Ensure Plus High Protein Cans or Servings Per Day:  1       Frequency:  Two Times a day  Entered: Oct 12 2024 11:23AM  

## 2024-10-15 NOTE — PROGRESS NOTE ADULT - SUBJECTIVE AND OBJECTIVE BOX
CC:Patient is a 89y old  Female who presents with a chief complaint of s/p mechanical fall (14 Oct 2024 13:21)    Overnight: No acute events     Subjective:  Pt seen and examined at bedside with chaperone. Pt is AAOx3, in no acute distress. Pt denied c/o fever, chills, chest pain, SOB, abd pain, N/V/D, extremity pain or dysfunction, hemoptysis, hematemesis, hematuria, hematochezia, headache, diplopia, vertigo, dizziness.    ROS:  otherwise as abovementioned ROS    Vital Signs Last 24 Hrs  T(C): 36.3 (15 Oct 2024 07:36), Max: 37 (15 Oct 2024 00:25)  T(F): 97.3 (15 Oct 2024 07:36), Max: 98.6 (15 Oct 2024 00:25)  HR: 74 (15 Oct 2024 07:36) (74 - 89)  BP: 116/56 (15 Oct 2024 07:36) (108/60 - 116/56)  BP(mean): 68 (14 Oct 2024 16:00) (68 - 68)  RR: 18 (15 Oct 2024 07:36) (18 - 18)  SpO2: 94% (15 Oct 2024 07:36) (93% - 94%)    Parameters below as of 15 Oct 2024 07:36  Patient On (Oxygen Delivery Method): room air        Labs:               10.6   7.29  )-----------( 169      ( 15 Oct 2024 06:10 )             33.2     CBC Full  -  ( 15 Oct 2024 06:10 )  WBC Count : 7.29 K/uL  RBC Count : 3.88 M/uL  Hemoglobin : 10.6 g/dL  Hematocrit : 33.2 %  Platelet Count - Automated : 169 K/uL  Mean Cell Volume : 85.6 fl  Mean Cell Hemoglobin : 27.3 pg  Mean Cell Hemoglobin Concentration : 31.9 gm/dL  Auto Neutrophil # : x  Auto Lymphocyte # : x  Auto Monocyte # : x  Auto Eosinophil # : x  Auto Basophil # : x  Auto Neutrophil % : x  Auto Lymphocyte % : x  Auto Monocyte % : x  Auto Eosinophil % : x  Auto Basophil % : x    10-15    133[L]  |  103  |  18  ----------------------------<  99  4.1   |  25  |  0.42[L]    Ca    8.4[L]      15 Oct 2024 06:10  Phos  2.8     10-15  Mg     2.0     10-15              Meds:  acetaminophen     Tablet .. 650 milliGRAM(s) Oral every 6 hours PRN  carbidopa/levodopa  25/100 1 Tablet(s) Oral three times a day  cyclobenzaprine 5 milliGRAM(s) Oral three times a day PRN  enoxaparin Injectable 40 milliGRAM(s) SubCutaneous every 24 hours  gabapentin 100 milliGRAM(s) Oral three times a day  lidocaine   4% Patch 1 Patch Transdermal every 24 hours  ondansetron Injectable 4 milliGRAM(s) IV Push every 6 hours PRN  oxyCODONE    IR 5 milliGRAM(s) Oral Once PRN  oxyCODONE    IR 2.5 milliGRAM(s) Oral every 6 hours PRN  polyethylene glycol 3350 17 Gram(s) Oral daily PRN  senna 2 Tablet(s) Oral at bedtime  sertraline 100 milliGRAM(s) Oral daily          Physical exam:  GCS of 15  Pt is aaox3  Pt in no acute distress  Airway is patent  Breathing is symmetric and unlabored  Neuro: CN II-XII grossly intact  Psych: normal affect  HEENT: normocephalic, SAM, EOM wnl, no gross craniofacial bony pathology to exam  HEAD: NECK: Normocephalic, occiput laceration repaired  clean dry and intact ,neck supple,  Chest: No gross rib or sternal pathology or tenderness to exam, no crepitus, no ecchymosis, no hematoma  Resp: CTAB  CVS: S1S2(+)  ABD: bowel sounds (+), soft, nontender, non distended, no rebound, no guarding, no rigidity, no pelvic instability to exam  EXT: no calf tenderness or edema to exam b/l, pt has good capillary refill in all digits. Sensoromotor function grossly intact, on VTE prophylaxis  MSK: Normal ROM without pain, no spinal tenderness, normal muscle strength/tone, pain at the hip, no pelvic instability, known b/l pelvic fractures  SKIN: No rashes or ulcers noted; no subcutaneous nodules or induration palpable, LLE chronic wound noted. Wound dressing in place, clean and dry            CC: Patient is a 89y old  Female who presents with a chief complaint of s/p mechanical fall (14 Oct 2024 13:21)    Overnight: No acute events     Subjective:  Pt seen and examined at bedside with chaperone. Pt is AAOx3, in no acute distress. Pt denied c/o fever, chills, chest pain, SOB, abd pain, N/V/D, extremity pain or dysfunction, hemoptysis, hematemesis, hematuria, hematochezia, headache, diplopia, vertigo, dizziness.    ROS:  otherwise as abovementioned ROS    Vital Signs Last 24 Hrs  T(C): 36.3 (15 Oct 2024 07:36), Max: 37 (15 Oct 2024 00:25)  T(F): 97.3 (15 Oct 2024 07:36), Max: 98.6 (15 Oct 2024 00:25)  HR: 74 (15 Oct 2024 07:36) (74 - 89)  BP: 116/56 (15 Oct 2024 07:36) (108/60 - 116/56)  BP(mean): 68 (14 Oct 2024 16:00) (68 - 68)  RR: 18 (15 Oct 2024 07:36) (18 - 18)  SpO2: 94% (15 Oct 2024 07:36) (93% - 94%)    Parameters below as of 15 Oct 2024 07:36  Patient On (Oxygen Delivery Method): room air        Labs:               10.6   7.29  )-----------( 169      ( 15 Oct 2024 06:10 )             33.2     CBC Full  -  ( 15 Oct 2024 06:10 )  WBC Count : 7.29 K/uL  RBC Count : 3.88 M/uL  Hemoglobin : 10.6 g/dL  Hematocrit : 33.2 %  Platelet Count - Automated : 169 K/uL  Mean Cell Volume : 85.6 fl  Mean Cell Hemoglobin : 27.3 pg  Mean Cell Hemoglobin Concentration : 31.9 gm/dL  Auto Neutrophil # : x  Auto Lymphocyte # : x  Auto Monocyte # : x  Auto Eosinophil # : x  Auto Basophil # : x  Auto Neutrophil % : x  Auto Lymphocyte % : x  Auto Monocyte % : x  Auto Eosinophil % : x  Auto Basophil % : x    10-15    133[L]  |  103  |  18  ----------------------------<  99  4.1   |  25  |  0.42[L]    Ca    8.4[L]      15 Oct 2024 06:10  Phos  2.8     10-15  Mg     2.0     10-15              Meds:  acetaminophen     Tablet .. 650 milliGRAM(s) Oral every 6 hours PRN  carbidopa/levodopa  25/100 1 Tablet(s) Oral three times a day  cyclobenzaprine 5 milliGRAM(s) Oral three times a day PRN  enoxaparin Injectable 40 milliGRAM(s) SubCutaneous every 24 hours  gabapentin 100 milliGRAM(s) Oral three times a day  lidocaine   4% Patch 1 Patch Transdermal every 24 hours  ondansetron Injectable 4 milliGRAM(s) IV Push every 6 hours PRN  oxyCODONE    IR 5 milliGRAM(s) Oral Once PRN  oxyCODONE    IR 2.5 milliGRAM(s) Oral every 6 hours PRN  polyethylene glycol 3350 17 Gram(s) Oral daily PRN  senna 2 Tablet(s) Oral at bedtime  sertraline 100 milliGRAM(s) Oral daily          Physical exam:  GCS of 15  Pt is aaox3  Pt in no acute distress  Airway is patent  Breathing is symmetric and unlabored  Neuro: CN II-XII grossly intact  Psych: normal affect  HEENT: normocephalic, SAM, EOM wnl, no gross craniofacial bony pathology to exam  HEAD: NECK: Normocephalic, occiput laceration repaired  clean dry and intact ,neck supple,  Chest: No gross rib or sternal pathology or tenderness to exam, no crepitus, no ecchymosis, no hematoma  Resp: CTAB  CVS: S1S2(+)  ABD: bowel sounds (+), soft, nontender, non distended, no rebound, no guarding, no rigidity, no pelvic instability to exam  EXT: no calf tenderness or edema to exam b/l, pt has good capillary refill in all digits. Sensoromotor function grossly intact, on VTE prophylaxis  MSK: Normal ROM without pain, no spinal tenderness, normal muscle strength/tone, pain at the hip, no pelvic instability, known b/l pelvic fractures  SKIN: No rashes or ulcers noted; no subcutaneous nodules or induration palpable, LLE chronic wound noted. Wound dressing in place, clean and dry

## 2024-10-15 NOTE — DISCHARGE NOTE PROVIDER - NSFOLLOWUPCLINICS_GEN_ALL_ED_FT
[facility]  Wound Care  [address] [place] [address]  Phone: [phone]  Fax: [phone]  Follow Up Time: 1 week

## 2024-10-15 NOTE — PROGRESS NOTE ADULT - ASSESSMENT
89 yo F s/p mechanical fall, with Mildly displaced fractures involving the left anterior and superior pubic rami, with extension to the anterior left acetabulum and nondisplaced fractures involving the right superior and inferior pubic rami.    Injuries  B/L pelvic fractures with extension to left acetabulum  S/P trauma/fall stairs    Plan:    Hospitalist for co-mgmt:  Parkinson's Disease  -  stable, continue Sinemet  -  fall precautions  Depression  -  continue Sertraline    Ortho recs appreciated:  - WBAT  - No acute orthopaedic interventions at this time  - Follow up with Dr. Schmitt in office    PT-  LES, Rolling walker     Wound care consult- LLE chronic wound     Incentive spirometry  Fall risk precautions  Pain control  F/U labs  Cont current care and meds  GI/DVT prophylaxis     D/W Attending

## 2024-10-22 DIAGNOSIS — E43 UNSPECIFIED SEVERE PROTEIN-CALORIE MALNUTRITION: ICD-10-CM

## 2024-10-22 DIAGNOSIS — S01.01XA LACERATION WITHOUT FOREIGN BODY OF SCALP, INITIAL ENCOUNTER: ICD-10-CM

## 2024-10-22 DIAGNOSIS — S32.502A UNSPECIFIED FRACTURE OF LEFT PUBIS, INITIAL ENCOUNTER FOR CLOSED FRACTURE: ICD-10-CM

## 2024-10-22 DIAGNOSIS — G20.A1 PARKINSON'S DISEASE WITHOUT DYSKINESIA, WITHOUT MENTION OF FLUCTUATIONS: ICD-10-CM

## 2024-10-22 DIAGNOSIS — S32.432A DISPLACED FRACTURE OF ANTERIOR COLUMN [ILIOPUBIC] OF LEFT ACETABULUM, INITIAL ENCOUNTER FOR CLOSED FRACTURE: ICD-10-CM

## 2024-10-22 DIAGNOSIS — F32.A DEPRESSION, UNSPECIFIED: ICD-10-CM
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
## 2024-10-22 DIAGNOSIS — E87.1 HYPO-OSMOLALITY AND HYPONATREMIA: ICD-10-CM
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
## 2024-10-22 DIAGNOSIS — S32.501A UNSPECIFIED FRACTURE OF RIGHT PUBIS, INITIAL ENCOUNTER FOR CLOSED FRACTURE: ICD-10-CM

## 2024-10-22 DIAGNOSIS — I87.8 OTHER SPECIFIED DISORDERS OF VEINS: ICD-10-CM

## 2024-10-22 DIAGNOSIS — E83.39 OTHER DISORDERS OF PHOSPHORUS METABOLISM: ICD-10-CM

## 2025-01-22 ENCOUNTER — APPOINTMENT (OUTPATIENT)
Dept: NEUROLOGY | Facility: CLINIC | Age: 89
End: 2025-01-22
Payer: MEDICARE

## 2025-01-22 VITALS
SYSTOLIC BLOOD PRESSURE: 134 MMHG | HEART RATE: 88 BPM | DIASTOLIC BLOOD PRESSURE: 78 MMHG | HEIGHT: 62 IN | BODY MASS INDEX: 20.61 KG/M2 | WEIGHT: 112 LBS

## 2025-01-22 DIAGNOSIS — R73.03 PREDIABETES.: ICD-10-CM

## 2025-01-22 DIAGNOSIS — Z87.891 PERSONAL HISTORY OF NICOTINE DEPENDENCE: ICD-10-CM

## 2025-01-22 DIAGNOSIS — Z86.2 PERSONAL HISTORY OF DISEASES OF THE BLOOD AND BLOOD-FORMING ORGANS AND CERTAIN DISORDERS INVOLVING THE IMMUNE MECHANISM: ICD-10-CM

## 2025-01-22 DIAGNOSIS — Z87.39 PERSONAL HISTORY OF OTHER DISEASES OF THE MUSCULOSKELETAL SYSTEM AND CONNECTIVE TISSUE: ICD-10-CM

## 2025-01-22 DIAGNOSIS — G20.A1 PARKINSON'S DISEASE WITHOUT DYSKINESIA, WITHOUT MENTION OF FLUCTUATIONS: ICD-10-CM

## 2025-01-22 DIAGNOSIS — Z86.59 PERSONAL HISTORY OF OTHER MENTAL AND BEHAVIORAL DISORDERS: ICD-10-CM

## 2025-01-22 DIAGNOSIS — G31.84 MILD COGNITIVE IMPAIRMENT, SO STATED: ICD-10-CM

## 2025-01-22 DIAGNOSIS — R26.81 UNSTEADINESS ON FEET: ICD-10-CM

## 2025-01-22 DIAGNOSIS — Z80.0 FAMILY HISTORY OF MALIGNANT NEOPLASM OF DIGESTIVE ORGANS: ICD-10-CM

## 2025-01-22 PROCEDURE — 99204 OFFICE O/P NEW MOD 45 MIN: CPT

## 2025-01-23 PROBLEM — Z86.69 PERSONAL HISTORY OF OTHER DISEASES OF THE NERVOUS SYSTEM AND SENSE ORGANS: Chronic | Status: ACTIVE | Noted: 2024-10-13

## 2025-01-29 ENCOUNTER — EMERGENCY (EMERGENCY)
Facility: HOSPITAL | Age: 89
LOS: 0 days | Discharge: HOME | End: 2025-01-29
Attending: EMERGENCY MEDICINE
Payer: MEDICARE

## 2025-01-29 VITALS
OXYGEN SATURATION: 98 % | SYSTOLIC BLOOD PRESSURE: 118 MMHG | HEART RATE: 81 BPM | DIASTOLIC BLOOD PRESSURE: 62 MMHG | RESPIRATION RATE: 17 BRPM | TEMPERATURE: 98 F

## 2025-01-29 VITALS
HEART RATE: 87 BPM | DIASTOLIC BLOOD PRESSURE: 60 MMHG | OXYGEN SATURATION: 99 % | TEMPERATURE: 98 F | SYSTOLIC BLOOD PRESSURE: 116 MMHG | RESPIRATION RATE: 18 BRPM

## 2025-01-29 DIAGNOSIS — G20.A1 PARKINSON'S DISEASE WITHOUT DYSKINESIA, WITHOUT MENTION OF FLUCTUATIONS: ICD-10-CM

## 2025-01-29 DIAGNOSIS — S09.90XA UNSPECIFIED INJURY OF HEAD, INITIAL ENCOUNTER: ICD-10-CM
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
## 2025-01-29 DIAGNOSIS — S00.31XA ABRASION OF NOSE, INITIAL ENCOUNTER: ICD-10-CM

## 2025-01-29 DIAGNOSIS — Z90.49 ACQUIRED ABSENCE OF OTHER SPECIFIED PARTS OF DIGESTIVE TRACT: Chronic | ICD-10-CM

## 2025-01-29 DIAGNOSIS — S00.83XA CONTUSION OF OTHER PART OF HEAD, INITIAL ENCOUNTER: ICD-10-CM
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
## 2025-01-29 DIAGNOSIS — Z98.1 ARTHRODESIS STATUS: Chronic | ICD-10-CM

## 2025-01-29 PROCEDURE — 72125 CT NECK SPINE W/O DYE: CPT | Mod: MC

## 2025-01-29 PROCEDURE — 72125 CT NECK SPINE W/O DYE: CPT | Mod: 26

## 2025-01-29 PROCEDURE — 70450 CT HEAD/BRAIN W/O DYE: CPT | Mod: MC

## 2025-01-29 PROCEDURE — 99284 EMERGENCY DEPT VISIT MOD MDM: CPT | Mod: 25

## 2025-01-29 PROCEDURE — 76376 3D RENDER W/INTRP POSTPROCES: CPT | Mod: 26

## 2025-01-29 PROCEDURE — 99284 EMERGENCY DEPT VISIT MOD MDM: CPT

## 2025-01-29 PROCEDURE — 70486 CT MAXILLOFACIAL W/O DYE: CPT | Mod: MC

## 2025-01-29 PROCEDURE — 76376 3D RENDER W/INTRP POSTPROCES: CPT

## 2025-01-29 PROCEDURE — 70450 CT HEAD/BRAIN W/O DYE: CPT | Mod: 26

## 2025-01-29 PROCEDURE — 70486 CT MAXILLOFACIAL W/O DYE: CPT | Mod: 26

## 2025-01-29 RX ORDER — ACETAMINOPHEN 80 MG/.8ML
1000 SOLUTION/ DROPS ORAL ONCE
Refills: 0 | Status: COMPLETED | OUTPATIENT
Start: 2025-01-29 | End: 2025-01-29

## 2025-01-29 RX ADMIN — ACETAMINOPHEN 1000 MILLIGRAM(S): 80 SOLUTION/ DROPS ORAL at 15:17

## 2025-01-29 NOTE — ED PROVIDER NOTE - OBJECTIVE STATEMENT
89-year-old female history of Parkinson's disease presents to the emergency department status post trip and fall.  Patient is here with daughter they state that patient tripped over the  fell and hit the left side of her face.  No LOC was able to get up and ambulate.  No other injuries now patient is complaining of left-sided face pain.  Exam with bruising to the left side of the face with abrasion to the left side of the nose.  No other signs of significant traumatic injury.  Neuro alert called on arrival will CT and reassess patient is not on anticoagulation.

## 2025-01-29 NOTE — ED ADULT NURSE NOTE - OBJECTIVE STATEMENT
BREAK COVERAGE RN: Pt is a 89 yo female who presents to the ED after a fall. Pt AOX4 and ambulatory at baseline. Pt states she was walking in her kitchen this morning and fell when getting something from the . Pt endorses (+) head strike, (-) blood thinners, (-) LOC. Pt has hematoma to the left eye. Pt denies any pain at this time. Pt denies chest pain, sob, dizziness, headache, blurry vision. Pt placed in view of the nurses station, fall risk band applied. Pending MD orders.

## 2025-01-29 NOTE — ED PROVIDER NOTE - PATIENT PORTAL LINK FT
You can access the FollowMyHealth Patient Portal offered by [facility] by registering at the following website: [email or web address]. By joining [employer]’s FollowMyHealth portal, you will also be able to view your health information using other applications (apps) compatible with our system.

## 2025-01-29 NOTE — ED PROVIDER NOTE - NSFOLLOWUPINSTRUCTIONS_ED_ALL_ED_FT
1. return for worsening symptoms or anything concerning to you  2. take all home meds as prescribed  3. follow up with your pmd call to make an appointment  4. If you cannot secure follow up with your PMD or specialist within 24 hours and you have non-emergent questions or concerns please call the [facility] ([phone]) in order to speak with an emergency physician open 24 hours/day, 7 days/week.    Head Injury    WHAT YOU NEED TO KNOW:    A head injury is most often caused by a blow to the head. This may occur from a fall, bicycle injury, sports injury, being struck in the head, or a motor vehicle accident.     DISCHARGE INSTRUCTIONS:    Call 911 or have someone else call for any of the following:     You cannot be woken.      You have a seizure.      You stop responding to others or you faint.      You have blurry or double vision.      Your speech becomes slurred or confused.      You have arm or leg weakness, loss of feeling, or new problems with coordination.      Your pupils are larger than usual or one pupil is a different size than the other.       You have blood or clear fluid coming out of your ears or nose.    Return to the emergency department if:     You have repeated or forceful vomiting.      You feel confused.      Your headache gets worse or becomes severe.      You or someone caring for you notices that you are harder to wake than usual.    Contact your healthcare provider if:     Your symptoms last longer than 6 weeks after the injury.      You have questions or concerns about your condition or care.    Medicines:     Acetaminophen decreases pain. Acetaminophen is available without a doctor's order. Ask how much to take and how often to take it. Follow directions. Acetaminophen can cause liver damage if not taken correctly.      Take your medicine as directed. Contact your healthcare provider if you think your medicine is not helping or if you have side effects. Tell him or her if you are allergic to any medicine. Keep a list of the medicines, vitamins, and herbs you take. Include the amounts, and when and why you take them. Bring the list or the pill bottles to follow-up visits. Carry your medicine list with you in case of an emergency.    Self-care:     Rest or do quiet activities for 24 to 48 hours. Limit your time watching TV, using the computer, or doing tasks that require a lot of thinking. Slowly return to your normal activities as directed. Do not play sports or do activities that may cause you to get hit in the head. Ask your healthcare provider when you can return to sports.       Apply ice on your head for 15 to 20 minutes every hour or as directed. Use an ice pack, or put crushed ice in a plastic bag. Cover it with a towel before you apply it to your skin. Ice helps prevent tissue damage and decreases swelling and pain.       Have someone stay with you for 24 hours or as directed. This person can monitor you for complications and call 911. When you are awake the person should ask you a few questions to see if you are thinking clearly. An example would be to ask your name or your address.     Prevent another head injury:     Wear a helmet that fits properly. Do this when you play sports, or ride a bike, scooter, or skateboard. Helmets help decrease your risk of a serious head injury. Talk to your healthcare provider about other ways you can protect yourself if you play sports.      Wear your seat belt every time you are in a car. This helps to decrease your risk for a head injury if you are in a car accident.     Follow up with your healthcare provider as directed: Write down your questions so you remember to ask them during your visits.

## 2025-01-29 NOTE — ED PROVIDER NOTE - CLINICAL SUMMARY MEDICAL DECISION MAKING FREE TEXT BOX
89-year-old female history of Parkinson's disease presents to the emergency department status post trip and fall.  Patient is here with daughter they state that patient tripped over the  fell and hit the left side of her face.  No LOC was able to get up and ambulate.  No other injuries now patient is complaining of left-sided face pain.  Exam with bruising to the left side of the face with abrasion to the left side of the nose.  No other signs of significant traumatic injury.  Neuro alert called on arrival will CT and reassess patient is not on anticoagulation. 89-year-old female history of Parkinson's disease presents to the emergency department status post trip and fall.  Patient is here with daughter they state that patient tripped over the  fell and hit the left side of her face.  No LOC was able to get up and ambulate.  No other injuries now patient is complaining of left-sided face pain.  Exam with bruising to the left side of the face with abrasion to the left side of the nose.  No other signs of significant traumatic injury.  Neuro alert called on arrival will CT and reassess patient is not on anticoagulation.  329 CT reviewed by myself and negative. pt ambulated without issue. daughter at bedside comfortable taking patient home. Will dc with follow up and strict return precautions.

## 2025-01-29 NOTE — ED ADULT TRIAGE NOTE - CHIEF COMPLAINT QUOTE
Pt presents to ER s/p unwitnessed mechanical trip fall one hour PTA. Pt reports she tripped over the  and fell onto her left side and struck her face on the floor. Denies LOC/AC. Bruising and multiple abrasions to left side of face; bleeding controlled

## 2025-01-29 NOTE — ED PROVIDER NOTE - PHYSICAL EXAMINATION
Constitutional: NAD   Eyes: PERRLA EOMI  Head: bruising to left side of face  ENT: No russo sign, no raccoon eyes,  no csf rhinorrhea, no nasal septal hematoma  Mouth: MMM  Cardiac: regular rate   Resp: unlabored breathing clear b/l   GI: Abd s/nt/nd  Neuro: grossly normal and intact GCS 15 no neuro deficits  Skin: abrasion left nose, no bruising to chest, back, abdomen or extremities  Msk: No midline spinal ttp, + ttp left maxilla no ttp to chest wall, pelvis stable, full ROM of all extremities without any ttp of extremities

## 2025-01-29 NOTE — ED PROVIDER NOTE - NSICDXPASTSURGICALHX_GEN_ALL_CORE_FT
----- Message from Tammy Archibald sent at 3/13/2020 11:54 AM CDT -----  Contact: Patient  Type: Needs Medical Advice  Who Called:  Pt  Best Call Back Number: [phone]  Additional Information: Pt calling in regards to Pain meds, have to get surgery on 3/25. Wants to know if she can stop taking meds until after the surgery?? Please call back and advise   PAST SURGICAL HISTORY:  History of appendectomy     History of lumbar spinal fusion

## 2025-01-29 NOTE — ED ADULT NURSE NOTE - NSFALLHARMRISKINTERV_ED_ALL_ED
Assistance OOB with selected safe patient handling equipment if applicable/Assistance with ambulation/Communicate risk of Fall with Harm to all staff, patient, and family/Monitor gait and stability/Provide visual cue: red socks, yellow wristband, yellow gown, etc/Reinforce activity limits and safety measures with patient and family/Bed in lowest position, wheels locked, appropriate side rails in place/Call bell, personal items and telephone in reach/Instruct patient to call for assistance before getting out of bed/chair/stretcher/Non-slip footwear applied when patient is off stretcher/[place] to call system/Physically safe environment - no spills, clutter or unnecessary equipment/Purposeful Proactive Rounding/Room/bathroom lighting operational, light cord in reach

## 2025-02-10 ENCOUNTER — APPOINTMENT (OUTPATIENT)
Dept: MRI IMAGING | Facility: CLINIC | Age: 89
End: 2025-02-10
Payer: MEDICARE

## 2025-02-10 ENCOUNTER — OUTPATIENT (OUTPATIENT)
Dept: OUTPATIENT SERVICES | Facility: HOSPITAL | Age: 89
LOS: 1 days | End: 2025-02-10
Payer: MEDICARE

## 2025-02-10 DIAGNOSIS — Z90.49 ACQUIRED ABSENCE OF OTHER SPECIFIED PARTS OF DIGESTIVE TRACT: Chronic | ICD-10-CM

## 2025-02-10 DIAGNOSIS — G31.84 MILD COGNITIVE IMPAIRMENT OF UNCERTAIN OR UNKNOWN ETIOLOGY: ICD-10-CM

## 2025-02-10 DIAGNOSIS — Z98.1 ARTHRODESIS STATUS: Chronic | ICD-10-CM

## 2025-02-10 DIAGNOSIS — G20.A1 PARKINSON'S DISEASE WITHOUT DYSKINESIA, WITHOUT MENTION OF FLUCTUATIONS: ICD-10-CM

## 2025-02-10 PROCEDURE — 70551 MRI BRAIN STEM W/O DYE: CPT | Mod: 26

## 2025-02-10 PROCEDURE — 70551 MRI BRAIN STEM W/O DYE: CPT

## 2025-02-11 ENCOUNTER — TRANSCRIPTION ENCOUNTER (OUTPATIENT)
Age: 89
End: 2025-02-11

## 2025-03-17 ENCOUNTER — APPOINTMENT (OUTPATIENT)
Dept: NEUROLOGY | Facility: CLINIC | Age: 89
End: 2025-03-17
Payer: MEDICARE

## 2025-03-17 VITALS
SYSTOLIC BLOOD PRESSURE: 122 MMHG | WEIGHT: 111 LBS | BODY MASS INDEX: 20.43 KG/M2 | DIASTOLIC BLOOD PRESSURE: 71 MMHG | HEART RATE: 68 BPM | HEIGHT: 62 IN

## 2025-03-17 DIAGNOSIS — G93.89 OTHER SPECIFIED DISORDERS OF BRAIN: ICD-10-CM

## 2025-03-17 DIAGNOSIS — G20.A1 PARKINSON'S DISEASE WITHOUT DYSKINESIA, WITHOUT MENTION OF FLUCTUATIONS: ICD-10-CM

## 2025-03-17 DIAGNOSIS — F03.90 UNSPECIFIED DEMENTIA W/OUT BEHAVIORAL DISTURBANCE: ICD-10-CM

## 2025-03-17 PROCEDURE — 99214 OFFICE O/P EST MOD 30 MIN: CPT | Mod: 25

## 2025-03-17 PROCEDURE — 96132 NRPSYC TST EVAL PHYS/QHP 1ST: CPT | Mod: 59

## 2025-03-17 PROCEDURE — 96138 PSYCL/NRPSYC TECH 1ST: CPT | Mod: 59

## 2025-03-17 RX ORDER — DONEPEZIL HYDROCHLORIDE 5 MG/1
5 TABLET ORAL DAILY
Qty: 30 | Refills: 2 | Status: ACTIVE | COMMUNITY
Start: 2025-03-17 | End: 1900-01-01

## 2025-04-07 ENCOUNTER — APPOINTMENT (OUTPATIENT)
Dept: NEUROSURGERY | Facility: CLINIC | Age: 89
End: 2025-04-07